# Patient Record
Sex: FEMALE | Race: WHITE | NOT HISPANIC OR LATINO | URBAN - METROPOLITAN AREA
[De-identification: names, ages, dates, MRNs, and addresses within clinical notes are randomized per-mention and may not be internally consistent; named-entity substitution may affect disease eponyms.]

---
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
## 2018-09-04 ENCOUNTER — EMERGENCY (EMERGENCY)
Facility: HOSPITAL | Age: 18
LOS: 1 days | Discharge: HOME | End: 2018-09-04
Admitting: EMERGENCY MEDICINE
Payer: COMMERCIAL

## 2018-09-04 VITALS
HEART RATE: 87 BPM | RESPIRATION RATE: 16 BRPM | OXYGEN SATURATION: 100 % | TEMPERATURE: 99 F | DIASTOLIC BLOOD PRESSURE: 78 MMHG | SYSTOLIC BLOOD PRESSURE: 114 MMHG

## 2018-09-04 DIAGNOSIS — F60.3 BORDERLINE PERSONALITY DISORDER: ICD-10-CM

## 2018-09-04 PROCEDURE — 90792 PSYCH DIAG EVAL W/MED SRVCS: CPT | Mod: GC

## 2018-09-04 PROCEDURE — 99285 EMERGENCY DEPT VISIT HI MDM: CPT

## 2018-09-04 NOTE — ED PROVIDER NOTE - OBJECTIVE STATEMENT
This is a 17 year old Female PMHX ANDIE from psychiatrist office for psych eval r/t suicidal ideations patient report a receter change in medication deyain nestor her perstique level is  and next week she will be adding Prozac. Today she told her therappuist in which she had suicidal ideations in which she would drive her care into a tree.t Denies any recnt self harm behavior. This is a 17 year old Female PMHX    ANDIE from psychiatrist office for psych eval r/t suicidal ideations patient reports a recent change in medication in which her Pristiq level is  and next week she will be adding Prozac. Today she told her therapist  she had suicidal ideations in which she would drive her care into a tree.  Denies any recent self harm behavior. Patient denies SI at this time Denies SI/HI Denies AH/VH Denies ETOH/Illicit drugs

## 2018-09-04 NOTE — ED BEHAVIORAL HEALTH ASSESSMENT NOTE - DETAILS
Patient had moment of SI with plan but no intent in therapists office while stressed. provider thought hypomania but seems dubious at present completed suicide in maternal aunt ETOH use disorder in family Discussed with Mother

## 2018-09-04 NOTE — ED BEHAVIORAL HEALTH ASSESSMENT NOTE - RISK ASSESSMENT
Patient poses moderate chronic and low acute risk for suicide, risk factors include: BPD, affective symptoms, anxiety protective factors and absent risk factors include: no hx of attempts, no hx of hospitalizations, SI is chrnoic and fleeting in nature with no intent, patient not currently self injurous, denies current SI, no substance, forward thinking: take care of cat, go to work, get back to school, start DBT, cites mother family, and the above as protective factors. Patient poses moderate chronic and low acute risk for suicide, risk factors include: BPD, affective symptoms, anxiety protective factors and absent risk factors include: no hx of attempts, no hx of hospitalizations, SI is chronic and fleeting in nature with no intent, patient not currently self injurious, denies current SI, no substance, forward thinking: take care of cat, go to work, get back to school, start DBT, cites mother family, and the above as protective factors.

## 2018-09-04 NOTE — ED BEHAVIORAL HEALTH ASSESSMENT NOTE - SUMMARY
17yo F, single college student, hx of affective symptoms tx in outpatient since age 7, presents from therapists office after she endorsed SI, currently meeting criteria for borderline personality disorder with r/o MDD, r/o ESTEFANIA, psychoeducation provided regarding expectations and DBT, patient's chronic and acute risk for suicide will be best reduced by outpatient treatment in DBT program which family is in process of starting. Appropriate for treat and release with close outpatient follow up from psychitrist and referral to DBT.  -Referrals provided for DBT by DARON and given to mother 18yo F, single college student, hx of affective symptoms tx in outpatient since age 7, presents from therapists office after she endorsed SI, currently meeting criteria for borderline personality disorder with r/o MDD, r/o ESTEFANIA, psychoeducation provided regarding expectations and DBT, patient's chronic and acute risk for suicide will be best reduced by outpatient treatment in DBT program which family is in process of starting. Appropriate for treat and release with close outpatient follow up from psychiatrist and referral to DBT.  -Referrals provided for DBT by DARON and given to mother

## 2018-09-04 NOTE — ED ADULT TRIAGE NOTE - CHIEF COMPLAINT QUOTE
Pt states if she had a car she'd swerve and hit something to kill herself.  Denies HI/drug/alcohol use.  Taking meds as prescribed Pt states if she had a car she'd swerve and hit something to kill herself.  Denies HI/drug/alcohol use.  Taking meds as prescribed.  PMH depression/anxiety

## 2018-09-04 NOTE — ED BEHAVIORAL HEALTH ASSESSMENT NOTE - SUICIDE PROTECTIVE FACTORS
Supportive social network or family/Responsibility to family and others/Positive therapeutic relationships/Identifies reasons for living/Engaged in work or school/Future oriented

## 2018-09-04 NOTE — ED BEHAVIORAL HEALTH ASSESSMENT NOTE - CASE SUMMARY
18yo F, single college student (on medical leave), hx of affective symptoms, in outpatient treatment since age 7, presents from therapist's office after she endorsed SI, currently meeting criteria for borderline personality disorder with r/o MDD, r/o ESTEFANIA. Patient endorsed suicidal ideation in context of panic attack in therapist office. Patient has chronic SI (however no history of prior suicide attempts), denying current intent in context of her borderline personality disorder.  Patient currently future oriented, closely engaged with outpatient providers, and in the process of engaging in DBT therapy. Both she and her mother feel safe to be discharged home. Safety planning discussed and patient to call 911 or return to nearest ER if symptoms should worsen.

## 2018-09-04 NOTE — ED BEHAVIORAL HEALTH ASSESSMENT NOTE - DESCRIPTION
no meds   ICU Vital Signs Last 24 Hrs  T(C): 37.1 (04 Sep 2018 19:13), Max: 37.1 (04 Sep 2018 19:13)  T(F): 98.7 (04 Sep 2018 19:13), Max: 98.7 (04 Sep 2018 19:13)  HR: 87 (04 Sep 2018 19:13) (87 - 87)  BP: 114/78 (04 Sep 2018 19:13) (114/78 - 114/78)  BP(mean): --  ABP: --  ABP(mean): --  RR: 16 (04 Sep 2018 19:13) (16 - 16)  SpO2: 100% (04 Sep 2018 19:13) (100% - 100%) asthma, no meds employed, student on leave

## 2018-09-04 NOTE — ED ADULT NURSE NOTE - CHIEF COMPLAINT QUOTE
Pt states if she had a car she'd swerve and hit something to kill herself.  Denies HI/drug/alcohol use.  Taking meds as prescribed.  PMH depression/anxiety

## 2018-09-04 NOTE — ED ADULT NURSE NOTE - NSIMPLEMENTINTERV_GEN_ALL_ED
Implemented All Universal Safety Interventions:  [place] to call system. Call bell, personal items and telephone within reach. Instruct patient to call for assistance. Room bathroom lighting operational. Non-slip footwear when patient is off stretcher. Physically safe environment: no spills, clutter or unnecessary equipment. Stretcher in lowest position, wheels locked, appropriate side rails in place.

## 2018-09-04 NOTE — ED PROVIDER NOTE - MEDICAL DECISION MAKING DETAILS
This is a 17 year old Female PMHX    ANDIE from psychiatrist office for psych eval r/t suicidal ideations patient reports a recent change in medication in which her Pristiq level is  and next week she will be adding Prozac. Today she told her therapist  she had suicidal ideations in which she would drive her care into a tree. Medical evaluation performed. There is no clinical evidence of intoxication or any acute medical problem requiring immediate intervention. Final disposition will be determined by psychiatrist.

## 2018-09-04 NOTE — ED ADULT NURSE NOTE - OBJECTIVE STATEMENT
pt is a 17 yr old female biba from md's office c/o suicidal thoughts, "I would drive may car into a tree". pt states she felt this  suicidal earlier today, but "not right now ". pt denies HI/ drug use/ alcohol use/ smoking.

## 2018-09-04 NOTE — ED BEHAVIORAL HEALTH NOTE - BEHAVIORAL HEALTH NOTE
Writer spoke with geo’s mother, Rupinder Jauregui, [phone], in the [facility] ED, for collateral information. She reported the following:    The patient resides with the informant. She has never had IP psychiatric care. She is in OP treatment with Dr. Shlomo Campbell, [phone], and Syl Keating, [phone]. Today she saw her therapist, who called the informant, reporting the patient had expressed suicidal ideation so the therapist had called 911.    The patient had been attending [facility] until she  began OP psychiatric treatment for depression a year ago. She is now on a medical leave from college. She has been eating and sleeping well. Her mood has been depressed continuously, and she felt her Pristiq was not working. Therefore, on 8/29/18, her Prisitq dosage, which was 50 mg daily, was cut in half in anticipation of starting her on fluoxetine 10 mg. The fluoxetine was originally to be started on Saturday, 9/8/18. However, since her mood has been more depressed for the past 3 days in the context of lowering her Pristiq, she was instructed to begin the fluoxetine tomorrow. She is also on clonazepam 0.5 mg prn not to exceed 1.5 mg daily, and a pre-metabolite of folic acid. 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 She has been socializing with others, and there is no evidence of anhedonia; she has joined a [email or web address] team. She has been tearful for the past few days. She has been suffering with minimal anergia and has been tearful, stating she feels “empty” for the past few days. The patient has never expressed passive or active suicidal ideation to the informant, including and has never engaged in self-injurious behavior. There has been no evidence of psychosis, and the patient has no history of substance abuse. There is no family history of mental illness.     The patient is allergic to sulfa drugs, which cause anaphylaxis, and Trintellix, which causes hives. She has also been tried on Zoloft, Latuda (which caused anxiety and restlessness).     The informant believes the patient is safe to be discharged and return to her OP providers. Writer spoke with geo’s mother, Rupinder Jauregui, [phone], in the [facility] ED, for collateral information. She reported the following:    The patient resides with the informant. She has never had IP psychiatric care. She is in OP treatment with Dr. Shlomo Campbell, [phone], and Syl Keating, [phone]. Today she saw her therapist, who called the informant, reporting the patient had expressed suicidal ideation so the therapist had called 911.    The patient had been [facility] [employer] [facility] until she  began OP psychiatric treatment for depression a year ago. She is now on a medical leave from college. She has been eating and sleeping well. Her mood has been depressed continuously, and she felt her Pristiq was not working. Therefore, on 8/29/18, her Prisitq dosage, which was 50 mg daily, was cut in half in anticipation of starting her on fluoxetine 10 mg. The fluoxetine was originally to be started on Saturday, 9/8/18. However, since her mood has been more depressed for the past 3 days in the context of lowering her Pristiq, she was instructed to begin the fluoxetine tomorrow. She is also on clonazepam 0.5 mg prn not to exceed 1.5 mg daily, and a pre-metabolite of folic acid. 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 She has been socializing with others, and there is no evidence of anhedonia; she has joined a [employer] team. She has been tearful for the past few days. She has been suffering with minimal anergia and has been tearful, stating she feels “empty” for the past few days. The patient has never expressed passive or active suicidal ideation to the informant, including and has never engaged in self-injurious behavior. There has been no evidence of psychosis, and the patient has no history of substance abuse. There is no family history of mental illness.     The patient is allergic to sulfa drugs, which cause anaphylaxis, and Trintellix, which causes hives. She has also been tried on Zoloft, Latuda (which caused anxiety and restlessness).     The informant believes the patient is safe to be discharged and return to her OP providers. The next scheduled appointments are with the psychiatrist on 9/19/18, which the informant will try and change to an earlier date. The next appointment with the therapist is early next month, date unknown. Writer provided numerous DBT referrals, determined to be needed by Dr. Campbell and the [facility] ED evaluating psychiatrist. Writer also provided psychoeducation regarding [email or web address] and [email or web address]. Writer spoke with geo’s mother, Rupinder Jauregui, [phone], in the [facility] ED, for collateral information. She reported the following:    The patient resides with the informant. She has never had IP psychiatric care. She is in OP treatment with Dr. Shlomo Campbell, [phone], and Syl Keating, [phone]. Today she saw her therapist, who called the informant, reporting the patient had expressed suicidal ideation so the therapist had called 911.    The patient had been attending [employer] [facility] [employer] until she  began OP psychiatric treatment for depression a year ago. She is now on a medical leave from college. She has been eating and sleeping well. Her mood has been depressed continuously, and she felt her Pristiq was not working. Therefore, on 8/29/18, her Prisitq dosage, which was 50 mg daily, was cut in half in anticipation of starting her on fluoxetine 10 mg. The fluoxetine was originally to be started on Saturday, 9/8/18. However, since her mood has been more depressed for the past 3 days in the context of lowering her Pristiq, she was instructed to begin the fluoxetine tomorrow. She is also on clonazepam 0.5 mg prn not to exceed 1.5 mg daily, and a pre-metabolite of folic acid. 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 She has been socializing with others, and there is no evidence of anhedonia; she has joined a [employer] team. She has been tearful for the past few days. She has been suffering with minimal anergia and has been tearful, stating she feels “empty” for the past few days. The patient has never expressed passive or active suicidal ideation to the informant, including and has never engaged in self-injurious behavior. There has been no evidence of psychosis, and the patient has no history of substance abuse. There is no family history of mental illness.     The patient is allergic to sulfa drugs, which cause anaphylaxis, and Trintellix, which causes hives. She has also been tried on Zoloft, Latuda (which caused anxiety and restlessness).     The informant believes the patient is safe to be discharged and return to her OP providers. The next scheduled appointments are with the psychiatrist on 9/19/18, which the informant will try and change to an earlier date. The next appointment with the therapist is early next month, date unknown. Writer provided numerous DBT referrals, determined to be needed by Dr. Campbell and the [facility] ED evaluating psychiatrist. Writer also provided psychoeducation regarding [email or web address] and [email or web address], since the informant mentioned difficulty paying for private providers.
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
## 2020-07-22 ENCOUNTER — RESULT REVIEW (OUTPATIENT)
Age: 20
End: 2020-07-22

## 2020-07-22 ENCOUNTER — EMERGENCY (EMERGENCY)
Facility: HOSPITAL | Age: 20
LOS: 1 days | Discharge: HOME | End: 2020-07-22
Admitting: EMERGENCY MEDICINE
Payer: COMMERCIAL

## 2020-07-22 VITALS
DIASTOLIC BLOOD PRESSURE: 70 MMHG | HEART RATE: 92 BPM | RESPIRATION RATE: 16 BRPM | SYSTOLIC BLOOD PRESSURE: 118 MMHG | TEMPERATURE: 98 F | OXYGEN SATURATION: 100 %

## 2020-07-22 DIAGNOSIS — F32.9 MAJOR DEPRESSIVE DISORDER, SINGLE EPISODE, UNSPECIFIED: ICD-10-CM

## 2020-07-22 DIAGNOSIS — F60.3 BORDERLINE PERSONALITY DISORDER: ICD-10-CM

## 2020-07-22 PROCEDURE — 99284 EMERGENCY DEPT VISIT MOD MDM: CPT

## 2020-07-22 PROCEDURE — 90792 PSYCH DIAG EVAL W/MED SRVCS: CPT

## 2020-07-22 NOTE — ED BEHAVIORAL HEALTH ASSESSMENT NOTE - REFERRAL / APPOINTMENT DETAILS
follow with Dr. Shlomo Odell Follow-up with Dr. Gutierrez on 7/27/20 at 2:00 PM; follow-up with therapist Amee Gallego tomorrow (7/23/20)
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
## 2020-07-22 NOTE — ED PROVIDER NOTE - OBJECTIVE STATEMENT
This is a 19 yr old F, pmh bipolar disorder, anxiety, depression , hypothyroid with c/o sever depression and SI for a month. Pt went today to her obgyn and expressed SI, she was sent in for further eval. Pt admits had previous suicidal attempt, cutting herself, years ago. PT currently seeing Dr Yanez () and he is veaning her off lamictal. This is a 19 yr old F, pmh bipolar disorder, anxiety, depression , hypothyroid with c/o sever depression and SI for a month. Pt went today to her obgyn and expressed SI, she was sent in for further eval. Pt admits had previous suicidal attempt, cutting herself, years ago. PT currently seeing Dr DAVIS Gutierrez () and he is weaning her off lamictal.

## 2020-07-22 NOTE — ED PROVIDER NOTE - NSFOLLOWUPINSTRUCTIONS_ED_ALL_ED_FT
Please follow up with your out patient psychiatrist as soon as possible.    Anxiety    Generalized anxiety disorder (ESTEFANIA) is a mental disorder. It is defined as anxiety that is not necessarily related to specific events or activities or is out of proportion to said events. Symptoms include restlessness, fatigue, difficulty concentrations, irritability and difficulty concentrating. It may interfere with life functions, including relationships, work, and school. If you were started on a medication, make sure to take exactly as prescribed and follow up with a psychiatrist.    SEEK IMMEDIATE MEDICAL CARE IF YOU HAVE ANY OF THE FOLLOWING SYMPTOMS: thoughts about hurting killing yourself, thoughts about hurting or killing somebody else, hallucinations, or worsening depression.  Depression    Depression is a mental illness that usually causes feelings of sadness, hopelessness, or helplessness. Some people with this disorder do not feel particularly sad but lose interest in doing things they used to enjoy. Major depressive disorder also can cause physical symptoms. It can interfere with work, school, relationships, and other normal everyday activities. If you were started on a medication, make sure to take exactly as prescribed and follow up with a psychiatrist.    SEEK IMMEDIATE MEDICAL CARE IF YOU HAVE ANY OF THE FOLLOWING SYMPTOMS: thoughts about hurting or killing yourself, thoughts about hurting or killing somebody else, hallucinations, or worsening depression.

## 2020-07-22 NOTE — ED BEHAVIORAL HEALTH ASSESSMENT NOTE - DETAILS
Patient had moment of SI with plan but no intent in therapists office while stressed. provider thought hypomania but seems dubious at present completed suicide in maternal aunt ETOH use disorder in family Discussed with Mother see HPI provider thought hypomania but seems dubious pt alludes to extensive h/o trauma, declines to provide details informed mother

## 2020-07-22 NOTE — ED BEHAVIORAL HEALTH ASSESSMENT NOTE - SUICIDE RISK FACTORS
Agitation/Severe Anxiety/Panic History of abuse/trauma/Cluster B Personality disorders or traits current/past/Family History of Suicidal behavior/Current mood episode

## 2020-07-22 NOTE — ED BEHAVIORAL HEALTH ASSESSMENT NOTE - SAFETY PLAN DETAILS
Call 911 or return to ED if you experience worsening SI or become danger to self or others. Call 911 or return to ED if worsening symptoms or if you develop thoughts of harming self or others

## 2020-07-22 NOTE — ED BEHAVIORAL HEALTH ASSESSMENT NOTE - OTHER
CVM "more depressed" pt refused to complete safety plan form but she engaged in verbal safety planning

## 2020-07-22 NOTE — ED BEHAVIORAL HEALTH ASSESSMENT NOTE - SUICIDE PROTECTIVE FACTORS
Responsibility to family and others/Identifies reasons for living/Has future plans/Supportive social network of family or friends/Engaged in work or school/Positive therapeutic relationships Responsibility to family and others/Supportive social network of family or friends/Positive therapeutic relationships/Has future plans/Identifies reasons for living
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
## 2020-07-22 NOTE — ED BEHAVIORAL HEALTH NOTE - BEHAVIORAL HEALTH NOTE
Writer contacted and spoke with pt’s mother, Rupinder, at [phone] who provided the following information:     Mother reports pt with hx of Depression and Anxiety. Pt resides with mother. Pt follows up regularly with outpatient treatment. Mother denies any concerns. Mother felt pt has been doing much better. No symptoms reported to mother. Mother denies concerns for SI/HI/AH/VH paranoia or psychosis. No access to firearms. No hx of substance use. Pt compliant with outpatient treatment. Mother reports pt in treatment with 2 therapists. Mother says 1 weekly and 1 monthly. Mother unable to provide therapist number at this time. Pt also in session with psychiatrist weekly as well. Pt compliant with medications. Mother reports Prozac increase a few days ago. No acute safety concerns reported. Mother wishes to pick pt up at this time. independent

## 2020-07-22 NOTE — ED BEHAVIORAL HEALTH ASSESSMENT NOTE - DESCRIPTION
asthma, no meds employed, student on leave calm, cooperative, in good behavioral control    Vital Signs Last 24 Hrs  T(C): 36.8 (22 Jul 2020 17:40), Max: 36.8 (22 Jul 2020 17:40)  T(F): 98.2 (22 Jul 2020 17:40), Max: 98.2 (22 Jul 2020 17:40)  HR: 92 (22 Jul 2020 17:40) (92 - 92)  BP: 118/70 (22 Jul 2020 17:40) (118/70 - 118/70)  BP(mean): --  RR: 16 (22 Jul 2020 17:40) (16 - 16)  SpO2: 100% (22 Jul 2020 17:40) (100% - 100%) hypothyroidism see HPI
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
## 2020-07-22 NOTE — ED BEHAVIORAL HEALTH ASSESSMENT NOTE - DIFFERENTIAL
borderline personality disorder with r/o MDD, r/o ESTEFANIA borderline personality disorder with r/o MDD

## 2020-07-22 NOTE — ED PROVIDER NOTE - PATIENT PORTAL LINK FT
You can access the FollowMyHealth Patient Portal offered by  by registering at the following website: [email or web address]. By joining [employer]’s FollowMyHealth portal, you will also be able to view your health information using other applications (apps) compatible with our system.

## 2020-07-22 NOTE — ED PROVIDER NOTE - CARE PLAN
Principal Discharge DX:	Borderline personality disorder  Secondary Diagnosis:	Depression, unspecified depression type

## 2020-07-22 NOTE — ED ADULT NURSE REASSESSMENT NOTE - NS ED NURSE REASSESS COMMENT FT1
Pt calm and cooperative at this time. Cleared for d/c by SYDNEE Pereyra. Family is scheduled to transport pt back to residence.

## 2020-07-22 NOTE — ED BEHAVIORAL HEALTH ASSESSMENT NOTE - SUMMARY
20 y/o female, single, noncaregiver, living with mother, not working and not in school, history of Borderline Personality Disorder Patient, no hx of SA, no hospitalizations, hx of SIB (between 7th to 9th grade), currently followed by outpatient psychiatrist Dr. Gutierrez and outpatient therapist Amee Gallego, no h/o violence or legal issues, PMH hypothyroidism, no h/o substance abuse, BIBEMS from OB/gyn's (Dr. Ng) office for worsening depression and SI.   Patient reports intermittent passive SI and fleeting fantasy of "swerving her car off the road" but adamantly denies wanting to act on this. She adamantly denies suicidal intent or plan. She currently denies passive or active SI. 18 y/o female, single, noncaregiver, living with mother, not working and not in school, history of Borderline Personality Disorder Patient, no hx of SA, no hospitalizations, hx of SIB (between 7th to 9th grade), currently followed by outpatient psychiatrist Dr. Gutierrez and outpatient therapist Amee Gallego, no h/o violence or legal issues, PMH hypothyroidism, no h/o substance abuse, BIBEMS from OB/gyn's (Dr. Ng) office for worsening depression and SI.   Patient reports intermittent passive SI and fleeting fantasy of "swerving her car off the road" but adamantly denies wanting to act on this. She adamantly denies suicidal intent or plan. She currently denies passive or active SI. Pt identifies reasons for living and is future-oriented. She is not manic or psychotic. Pt's mother and Dr. Gutierrez deny acute safety concerns. She does not present an acute danger to self or others and does not meet criteria for involuntary psychiatric admission at this time. Pt declines voluntary psychiatric admission at this time.

## 2020-07-22 NOTE — ED BEHAVIORAL HEALTH ASSESSMENT NOTE - ACTIVATING EVENTS/STRESSORS
Change in provider or treatment (i.e., medications, psychotherapy, milieu) Triggering events leading to humiliation, shame, and/or despair (e.g. Loss of relationship, financial or health status) (real or anticipated)
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
## 2020-07-22 NOTE — ED BEHAVIORAL HEALTH ASSESSMENT NOTE - RISK ASSESSMENT
Patient poses moderate chronic and low acute risk for suicide, risk factors include: BPD, affective symptoms, anxiety protective factors and absent risk factors include: no hx of attempts, no hx of hospitalizations, SI is chronic and fleeting in nature with no intent, patient not currently self injurious, denies current SI, no substance, forward thinking: take care of cat, go to work, get back to school, start DBT, cites mother family, and the above as protective factors. Protective factors include no suicide attempts, no violence history, medication compliance, no access to guns, no global insomnia, no substance abuse, supportive family, willingness to seek help, no suicidal ideation or homicidal ideation, identifies reasons for living.  Risk factors include history of self-harming (cutting) in past, borderline personality disorder, h/o suicide in maternal aunt.   Pt is not at acutely elevated risk of harm to self or others. Low Acute Suicide Risk

## 2020-07-22 NOTE — ED BEHAVIORAL HEALTH ASSESSMENT NOTE - CURRENT MEDICATION
Lamictal 300mg, Klonopin 0.5mg PRN, Pristiq 50mg and down titrating Lamictal 150 mg po QHS, Prozac 80 mg po daily, Klonopin 0.5 mg po TID prn anxiety, Leucovorin 25 mg po daily, Macrobid for UTI (unknown dose), Augmentin for strep throat (unknown dose)

## 2020-07-24 NOTE — ED BEHAVIORAL HEALTH NOTE - BEHAVIORAL HEALTH NOTE
HIGH RISK LOG:   called patient at her number ([phone]) to follow up and see how she was feeling.  Pt did not answer and voicemail was left with [facility] ED phone number.

## 2020-07-25 NOTE — ED BEHAVIORAL HEALTH NOTE - BEHAVIORAL HEALTH NOTE
High Risk Log: Writer called pt at  who states she is doing better, and has an appointment with her therapist and psychiatrist on Monday.

## 2021-06-30 PROBLEM — F32.9 MAJOR DEPRESSIVE DISORDER, SINGLE EPISODE, UNSPECIFIED: Chronic | Status: ACTIVE | Noted: 2020-07-22

## 2021-06-30 PROBLEM — F41.9 ANXIETY DISORDER, UNSPECIFIED: Chronic | Status: ACTIVE | Noted: 2020-07-22

## 2021-06-30 PROBLEM — F31.9 BIPOLAR DISORDER, UNSPECIFIED: Chronic | Status: ACTIVE | Noted: 2020-07-22

## 2021-07-08 ENCOUNTER — NON-APPOINTMENT (OUTPATIENT)
Age: 21
End: 2021-07-08

## 2021-08-30 ENCOUNTER — APPOINTMENT (OUTPATIENT)
Dept: INTERNAL MEDICINE | Facility: CLINIC | Age: 21
End: 2021-08-30
Payer: COMMERCIAL

## 2021-08-30 VITALS
TEMPERATURE: 98 F | BODY MASS INDEX: 31.92 KG/M2 | OXYGEN SATURATION: 99 % | HEIGHT: 57.48 IN | RESPIRATION RATE: 16 BRPM | SYSTOLIC BLOOD PRESSURE: 111 MMHG | WEIGHT: 150 LBS | DIASTOLIC BLOOD PRESSURE: 74 MMHG | HEART RATE: 84 BPM

## 2021-08-30 DIAGNOSIS — Z83.49 FAMILY HISTORY OF OTHER ENDOCRINE, NUTRITIONAL AND METABOLIC DISEASES: ICD-10-CM

## 2021-08-30 PROCEDURE — 99385 PREV VISIT NEW AGE 18-39: CPT | Mod: 25

## 2021-09-13 ENCOUNTER — TRANSCRIPTION ENCOUNTER (OUTPATIENT)
Age: 21
End: 2021-09-13

## 2021-10-26 ENCOUNTER — TRANSCRIPTION ENCOUNTER (OUTPATIENT)
Age: 21
End: 2021-10-26

## 2022-01-12 ENCOUNTER — EMERGENCY (EMERGENCY)
Facility: HOSPITAL | Age: 22
LOS: 1 days | Discharge: HOME | End: 2022-01-12
Attending: EMERGENCY MEDICINE | Admitting: EMERGENCY MEDICINE
Payer: COMMERCIAL

## 2022-01-12 VITALS
SYSTOLIC BLOOD PRESSURE: 114 MMHG | RESPIRATION RATE: 18 BRPM | HEART RATE: 90 BPM | OXYGEN SATURATION: 100 % | DIASTOLIC BLOOD PRESSURE: 73 MMHG | TEMPERATURE: 98 F

## 2022-01-12 PROCEDURE — 99284 EMERGENCY DEPT VISIT MOD MDM: CPT | Mod: 25

## 2022-01-12 PROCEDURE — 93010 ELECTROCARDIOGRAM REPORT: CPT

## 2022-01-12 RX ORDER — CLONAZEPAM 1 MG
0.5 TABLET ORAL ONCE
Refills: 0 | Status: DISCONTINUED | OUTPATIENT
Start: 2022-01-12 | End: 2022-01-12

## 2022-01-12 RX ORDER — LAMOTRIGINE 25 MG/1
125 TABLET, ORALLY DISINTEGRATING ORAL ONCE
Refills: 0 | Status: COMPLETED | OUTPATIENT
Start: 2022-01-12 | End: 2022-01-12

## 2022-01-12 RX ADMIN — Medication 40 MILLIGRAM(S): at 23:30

## 2022-01-12 RX ADMIN — LAMOTRIGINE 125 MILLIGRAM(S): 25 TABLET, ORALLY DISINTEGRATING ORAL at 23:28

## 2022-01-12 RX ADMIN — Medication 0.5 MILLIGRAM(S): at 23:29

## 2022-01-12 NOTE — ED ADULT NURSE NOTE - OBJECTIVE STATEMENT
Pt received to rm 27, A&OX4, ambulatory. C/o feeling anxious, having palpitations, and generalized weakness. Pt states she was resting at home when she suddenly had an increased HR, measured 140-170 via pulse ox. Trembling of hands noted. Pt states she had an adjustment to her psych meds about 1 month ago. Denies CP, SOB, N/V, dizziness, blurry vision, HA, LOC. Received with 20G IV to R AC placed by EMS with 1L NS running. Respirations even and unlabored. Will continue to monitor.

## 2022-01-12 NOTE — ED ADULT NURSE NOTE - CHIEF COMPLAINT QUOTE
Presents to ED via EMS, c/o palpitations around 07:30 PM and endorsing dizziness and generalized weakness. Per family member, HR was in 140s from pulse ox. No complaints of chest pain, SOB, nausea or vomiting. Denies ETOH or drug use, SI/HI. Arrived with 20G IV on right AC, received a liter of NS from EMS. PMH asthma, bipolar disorder, depression and anxiety.    Per MD Iyer, no code stroke

## 2022-01-12 NOTE — ED PROVIDER NOTE - CLINICAL SUMMARY MEDICAL DECISION MAKING FREE TEXT BOX
22 y/o female with pmhx of asthma, bipolar disorder, depression, and anxiety presenting with episode of dizziness, nausea, palpitations, and generalized weakness with no LOC, chest pain, SOB. EKG with no acute changes, HR now in 80s, hemodynamically stable, persistently anxious, and symptoms resolved other than mild dizziness. Will check Upreg and give missed anxiety/depression nighttime meds and reassess

## 2022-01-12 NOTE — ED PROVIDER NOTE - OBJECTIVE STATEMENT
20 y/o female with pmhx of asthma, bipolar disorder, depression, and anxiety presenting with episode of dizziness, nausea, palpitations, and generalized weakness. Per mother, HR was in 140s. Patient denies LOC, chest pain, SOB, dyspnea, numbness/tingling, or vomiting. No drug/ETOG use. No vaginal bleeding. No recent fevers/chills, n/v/d, cough, rashes, or changes in urination. Did not take nightly depression/anxiety meds (Klonopin, paxil, lamictal). Symptoms resolved in ER other than mild dizziness.

## 2022-01-12 NOTE — ED PROVIDER NOTE - PROGRESS NOTE DETAILS
Alvin, PGY-2  Patient reassessed and appears much less anxious and states that she feels back at her baseline. Upreg negative. EKG with no acute abnormalities. Will d/c with return precautions

## 2022-01-12 NOTE — ED PROVIDER NOTE - NSFOLLOWUPINSTRUCTIONS_ED_ALL_ED_FT
A palpitation is the feeling that your heartbeat is irregular or is faster than normal. It may feel like your heart is fluttering or skipping a beat. They may be caused by many things, including smoking, caffeine, alcohol, stress, and certain medicines. Although most causes of palpitations are not serious, palpitations can be a sign of a serious medical problem. Avoid caffeine, alcohol, and tobacco products at home. Try to reduce stress and anxiety and make sure to get plenty of rest.     SEEK IMMEDIATE MEDICAL CARE IF YOU HAVE ANY OF THE FOLLOWING SYMPTOMS: chest pain, shortness of breath, severe headache, dizziness/lightheadedness, or fainting.

## 2022-01-12 NOTE — ED PROVIDER NOTE - PATIENT PORTAL LINK FT
You can access the FollowMyHealth Patient Portal offered by [facility] by registering at the following website: [email or web address]. By joining [employer]’s FollowMyHealth portal, you will also be able to view your health information using other applications (apps) compatible with our system.

## 2022-01-12 NOTE — ED PROVIDER NOTE - NS ED ROS FT
Gen: Denies fever, weight loss  CV: Denies chest pain  Skin: Denies rash, erythema, color changes  Resp: Denies SOB, cough  Endo: Denies sensitivity to heat, cold, increased urination  GI: Denies diarrhea, constipation, vomiting  Msk: Denies back pain, LE swelling, extremity pain  : Denies dysuria, increased frequency  Neuro: Denies LOC, weakness

## 2022-01-12 NOTE — ED PROVIDER NOTE - PHYSICAL EXAMINATION
Gen: WDWN, anxious, HR in 80s   HEENT: PERRLA, EOMI, no nasal discharge, mucous membranes moist, no oropharyngeal edema/erythema/exudates   CV: RRR, +S1/S2, no M/R/G, equal b/l radial pulses 2+  Resp: CTAB, no W/R/R, SPO2 >95% on RA, no increased WOB   GI: Abdomen soft non-distended, NTTP, no masses/organomegaly   Skin: No open wounds, no bruising, no LE edema  Neuro: A&Ox4, moving all 4 extremities spontaneously, gross sensation intact in UE and LE BL  Psych: anxious

## 2022-01-12 NOTE — ED ADULT TRIAGE NOTE - CHIEF COMPLAINT QUOTE
Presents to ED via EMS, c/o palpitations around 07:30 PM and endorsing dizziness. Per family member, HR was in 140s from pulse ox. No complaints of chest pain, SOB, nausea or vomiting. Denies ETOH or drug use, SI/HI. PMH asthma, bipolar disorder, depression and anxiety. Presents to ED via EMS, c/o palpitations around 07:30 PM and endorsing dizziness and generalized weakness. Per family member, HR was in 140s from pulse ox. No complaints of chest pain, SOB, nausea or vomiting. Denies ETOH or drug use, SI/HI. Arrived with 20G IV on right AC from EMS. PMH asthma, bipolar disorder, depression and anxiety. Presents to ED via EMS, c/o palpitations around 07:30 PM and endorsing dizziness and generalized weakness. Per family member, HR was in 140s from pulse ox. No complaints of chest pain, SOB, nausea or vomiting. Denies ETOH or drug use, SI/HI. Arrived with 20G IV on right AC, received a liter of NS from EMS. PMH asthma, bipolar disorder, depression and anxiety. Presents to ED via EMS, c/o palpitations around 07:30 PM and endorsing dizziness and generalized weakness. Per family member, HR was in 140s from pulse ox. No complaints of chest pain, SOB, nausea or vomiting. Denies ETOH or drug use, SI/HI. Arrived with 20G IV on right AC, received a liter of NS from EMS. PMH asthma, bipolar disorder, depression and anxiety.    Per MD Iyer, no code stroke

## 2022-01-12 NOTE — ED PROVIDER NOTE - ATTENDING CONTRIBUTION TO CARE
The patient is a 21y Female who has a past medical and surgery history of Asthma Bipolar disorder Depression Anxiety PTED with Presents to ED via EMS, c/o palpitations around 07:30 PM and endorsing dizziness and generalized weakness. Per family member, HR was in 140s from pulse ox. No complaints of chest pain, SOB, nausea or vomiting. Denies ETOH or drug use, SI/HI. Arrived with 20G IV on right AC, received a liter of NS from EMS. PMH asthma, bipolar disorder, depression and anxiety   Vital Signs Last 24 Hrs  T(F): 98.1 HR: 90 BP: 114/73 RR: 18 SpO2: 100% (12 Jan 2022 20:52) (100% - 100%)  PE: as described; my additions and exceptions are noted in the chart    DATA:  EKG: Normal; NSR@79   LAB: Pending at time of evaluation    IMPRESSION/RISK:  Dx=Palpitations in young female with normal EKG and exam     Consideration include doubt organic dz; anxiety and possible med withdrawal  Plan  ucg   reg meds  NS bolus  reassess  reassurance  d/c with followup

## 2022-01-13 VITALS
OXYGEN SATURATION: 100 % | RESPIRATION RATE: 18 BRPM | HEART RATE: 87 BPM | SYSTOLIC BLOOD PRESSURE: 111 MMHG | TEMPERATURE: 98 F | DIASTOLIC BLOOD PRESSURE: 73 MMHG

## 2022-02-04 ENCOUNTER — TRANSCRIPTION ENCOUNTER (OUTPATIENT)
Age: 22
End: 2022-02-04

## 2022-03-16 ENCOUNTER — APPOINTMENT (OUTPATIENT)
Dept: INTERNAL MEDICINE | Facility: CLINIC | Age: 22
End: 2022-03-16

## 2022-05-02 ENCOUNTER — APPOINTMENT (OUTPATIENT)
Dept: INTERNAL MEDICINE | Facility: CLINIC | Age: 22
End: 2022-05-02

## 2022-05-02 ENCOUNTER — APPOINTMENT (OUTPATIENT)
Dept: INTERNAL MEDICINE | Facility: CLINIC | Age: 22
End: 2022-05-02
Payer: COMMERCIAL

## 2022-05-02 VITALS
WEIGHT: 160 LBS | HEIGHT: 57.48 IN | RESPIRATION RATE: 16 BRPM | DIASTOLIC BLOOD PRESSURE: 76 MMHG | OXYGEN SATURATION: 98 % | SYSTOLIC BLOOD PRESSURE: 120 MMHG | BODY MASS INDEX: 34.04 KG/M2 | TEMPERATURE: 97.9 F | HEART RATE: 91 BPM

## 2022-05-02 DIAGNOSIS — R35.0 FREQUENCY OF MICTURITION: ICD-10-CM

## 2022-05-02 PROCEDURE — 81003 URINALYSIS AUTO W/O SCOPE: CPT | Mod: QW

## 2022-05-02 PROCEDURE — 99214 OFFICE O/P EST MOD 30 MIN: CPT

## 2022-05-02 RX ORDER — NITROFURANTOIN MACROCRYSTALS 100 MG/1
100 CAPSULE ORAL
Qty: 10 | Refills: 0 | Status: ACTIVE | COMMUNITY
Start: 2022-05-02 | End: 1900-01-01

## 2022-05-04 LAB
ALBUMIN SERPL ELPH-MCNC: 4.2 G/DL
ALP BLD-CCNC: 79 U/L
ALT SERPL-CCNC: 10 U/L
ANION GAP SERPL CALC-SCNC: 13 MMOL/L
AST SERPL-CCNC: 15 U/L
BACTERIA UR CULT: NORMAL
BASOPHILS # BLD AUTO: 0.03 K/UL
BASOPHILS NFR BLD AUTO: 0.6 %
BILIRUB SERPL-MCNC: 0.2 MG/DL
BILIRUB UR QL STRIP: NEGATIVE
BUN SERPL-MCNC: 7 MG/DL
C TRACH RRNA SPEC QL NAA+PROBE: NOT DETECTED
CALCIUM SERPL-MCNC: 9.1 MG/DL
CHLORIDE SERPL-SCNC: 103 MMOL/L
CHOLEST SERPL-MCNC: 212 MG/DL
CLARITY UR: CLEAR
CO2 SERPL-SCNC: 23 MMOL/L
COLLECTION METHOD: NORMAL
CREAT SERPL-MCNC: 0.71 MG/DL
EGFR: 122 ML/MIN/1.73M2
EOSINOPHIL # BLD AUTO: 0.02 K/UL
EOSINOPHIL NFR BLD AUTO: 0.4 %
GLUCOSE SERPL-MCNC: 90 MG/DL
GLUCOSE UR-MCNC: NEGATIVE
HAV IGM SER QL: NONREACTIVE
HBV CORE IGM SER QL: NONREACTIVE
HBV SURFACE AG SER QL: NONREACTIVE
HCG UR QL: 0.2 EU/DL
HCT VFR BLD CALC: 38.6 %
HCV AB SER QL: NONREACTIVE
HCV S/CO RATIO: 0.12 S/CO
HDLC SERPL-MCNC: 77 MG/DL
HGB BLD-MCNC: 12.2 G/DL
HGB UR QL STRIP.AUTO: NORMAL
HIV1+2 AB SPEC QL IA.RAPID: NONREACTIVE
IMM GRANULOCYTES NFR BLD AUTO: 0.4 %
KETONES UR-MCNC: NEGATIVE
LDLC SERPL CALC-MCNC: 115 MG/DL
LEUKOCYTE ESTERASE UR QL STRIP: NORMAL
LYMPHOCYTES # BLD AUTO: 2.19 K/UL
LYMPHOCYTES NFR BLD AUTO: 41.7 %
M TB IFN-G BLD-IMP: NEGATIVE
MAN DIFF?: NORMAL
MCHC RBC-ENTMCNC: 29.5 PG
MCHC RBC-ENTMCNC: 31.6 GM/DL
MCV RBC AUTO: 93.5 FL
MONOCYTES # BLD AUTO: 0.38 K/UL
MONOCYTES NFR BLD AUTO: 7.2 %
N GONORRHOEA RRNA SPEC QL NAA+PROBE: NOT DETECTED
NEUTROPHILS # BLD AUTO: 2.61 K/UL
NEUTROPHILS NFR BLD AUTO: 49.7 %
NITRITE UR QL STRIP: NEGATIVE
NONHDLC SERPL-MCNC: 135 MG/DL
PH UR STRIP: 5.5
PLATELET # BLD AUTO: 340 K/UL
POTASSIUM SERPL-SCNC: 4.3 MMOL/L
PROT SERPL-MCNC: 7.3 G/DL
PROT UR STRIP-MCNC: NORMAL
QUANTIFERON TB PLUS MITOGEN MINUS NIL: 9.84 IU/ML
QUANTIFERON TB PLUS NIL: 0.01 IU/ML
QUANTIFERON TB PLUS TB1 MINUS NIL: 0.06 IU/ML
QUANTIFERON TB PLUS TB2 MINUS NIL: 0.09 IU/ML
RBC # BLD: 4.13 M/UL
RBC # FLD: 12.5 %
SODIUM SERPL-SCNC: 140 MMOL/L
SOURCE AMPLIFICATION: NORMAL
SP GR UR STRIP: 1.03
T PALLIDUM AB SER QL IA: NEGATIVE
TRIGL SERPL-MCNC: 97 MG/DL
TSH SERPL-ACNC: 4.99 UIU/ML
WBC # FLD AUTO: 5.25 K/UL

## 2022-05-05 ENCOUNTER — NON-APPOINTMENT (OUTPATIENT)
Age: 22
End: 2022-05-05

## 2022-05-23 ENCOUNTER — NON-APPOINTMENT (OUTPATIENT)
Age: 22
End: 2022-05-23

## 2022-07-28 NOTE — ED ADULT TRIAGE NOTE - CADM TRG TX PRIOR TO ARRIVAL
none + prox phaylx fracture and laceration. +tendon injury. Ortho consulted. abx given and lac repaired. will follow with ortho.

## 2022-10-11 ENCOUNTER — INPATIENT (INPATIENT)
Facility: HOSPITAL | Age: 22
LOS: 2 days | Discharge: HOME | End: 2022-10-14
Attending: PSYCHIATRY & NEUROLOGY | Admitting: PSYCHIATRY & NEUROLOGY

## 2022-10-11 VITALS
RESPIRATION RATE: 16 BRPM | OXYGEN SATURATION: 100 % | TEMPERATURE: 99 F | SYSTOLIC BLOOD PRESSURE: 130 MMHG | DIASTOLIC BLOOD PRESSURE: 69 MMHG | HEART RATE: 123 BPM

## 2022-10-11 DIAGNOSIS — F60.3 BORDERLINE PERSONALITY DISORDER: ICD-10-CM

## 2022-10-11 LAB
ALBUMIN SERPL ELPH-MCNC: 4.3 G/DL — SIGNIFICANT CHANGE UP (ref 3.3–5)
ALP SERPL-CCNC: 80 U/L — SIGNIFICANT CHANGE UP (ref 40–120)
ALT FLD-CCNC: 14 U/L — SIGNIFICANT CHANGE UP (ref 4–33)
ANION GAP SERPL CALC-SCNC: 14 MMOL/L — SIGNIFICANT CHANGE UP (ref 7–14)
APAP SERPL-MCNC: <10 UG/ML — LOW (ref 15–25)
AST SERPL-CCNC: 16 U/L — SIGNIFICANT CHANGE UP (ref 4–32)
BASOPHILS # BLD AUTO: 0.04 K/UL — SIGNIFICANT CHANGE UP (ref 0–0.2)
BASOPHILS NFR BLD AUTO: 0.6 % — SIGNIFICANT CHANGE UP (ref 0–2)
BILIRUB SERPL-MCNC: 0.3 MG/DL — SIGNIFICANT CHANGE UP (ref 0.2–1.2)
BUN SERPL-MCNC: 5 MG/DL — LOW (ref 7–23)
CALCIUM SERPL-MCNC: 9.1 MG/DL — SIGNIFICANT CHANGE UP (ref 8.4–10.5)
CHLORIDE SERPL-SCNC: 104 MMOL/L — SIGNIFICANT CHANGE UP (ref 98–107)
CO2 SERPL-SCNC: 24 MMOL/L — SIGNIFICANT CHANGE UP (ref 22–31)
CREAT SERPL-MCNC: 0.87 MG/DL — SIGNIFICANT CHANGE UP (ref 0.5–1.3)
EGFR: 96 ML/MIN/1.73M2 — SIGNIFICANT CHANGE UP
EOSINOPHIL # BLD AUTO: 0.01 K/UL — SIGNIFICANT CHANGE UP (ref 0–0.5)
EOSINOPHIL NFR BLD AUTO: 0.2 % — SIGNIFICANT CHANGE UP (ref 0–6)
ETHANOL SERPL-MCNC: <10 MG/DL — SIGNIFICANT CHANGE UP
FLUAV AG NPH QL: SIGNIFICANT CHANGE UP
FLUBV AG NPH QL: SIGNIFICANT CHANGE UP
GLUCOSE SERPL-MCNC: 107 MG/DL — HIGH (ref 70–99)
HCG SERPL-ACNC: <5 MIU/ML — SIGNIFICANT CHANGE UP
HCT VFR BLD CALC: 37.7 % — SIGNIFICANT CHANGE UP (ref 34.5–45)
HGB BLD-MCNC: 11.9 G/DL — SIGNIFICANT CHANGE UP (ref 11.5–15.5)
IANC: 4.16 K/UL — SIGNIFICANT CHANGE UP (ref 1.8–7.4)
IMM GRANULOCYTES NFR BLD AUTO: 0.2 % — SIGNIFICANT CHANGE UP (ref 0–0.9)
LYMPHOCYTES # BLD AUTO: 1.89 K/UL — SIGNIFICANT CHANGE UP (ref 1–3.3)
LYMPHOCYTES # BLD AUTO: 28.8 % — SIGNIFICANT CHANGE UP (ref 13–44)
MCHC RBC-ENTMCNC: 27.9 PG — SIGNIFICANT CHANGE UP (ref 27–34)
MCHC RBC-ENTMCNC: 31.6 GM/DL — LOW (ref 32–36)
MCV RBC AUTO: 88.3 FL — SIGNIFICANT CHANGE UP (ref 80–100)
MONOCYTES # BLD AUTO: 0.45 K/UL — SIGNIFICANT CHANGE UP (ref 0–0.9)
MONOCYTES NFR BLD AUTO: 6.9 % — SIGNIFICANT CHANGE UP (ref 2–14)
NEUTROPHILS # BLD AUTO: 4.16 K/UL — SIGNIFICANT CHANGE UP (ref 1.8–7.4)
NEUTROPHILS NFR BLD AUTO: 63.3 % — SIGNIFICANT CHANGE UP (ref 43–77)
NRBC # BLD: 0 /100 WBCS — SIGNIFICANT CHANGE UP (ref 0–0)
NRBC # FLD: 0 K/UL — SIGNIFICANT CHANGE UP (ref 0–0)
PLATELET # BLD AUTO: 329 K/UL — SIGNIFICANT CHANGE UP (ref 150–400)
POTASSIUM SERPL-MCNC: 3.5 MMOL/L — SIGNIFICANT CHANGE UP (ref 3.5–5.3)
POTASSIUM SERPL-SCNC: 3.5 MMOL/L — SIGNIFICANT CHANGE UP (ref 3.5–5.3)
PROT SERPL-MCNC: 7.3 G/DL — SIGNIFICANT CHANGE UP (ref 6–8.3)
RBC # BLD: 4.27 M/UL — SIGNIFICANT CHANGE UP (ref 3.8–5.2)
RBC # FLD: 12.6 % — SIGNIFICANT CHANGE UP (ref 10.3–14.5)
RSV RNA NPH QL NAA+NON-PROBE: SIGNIFICANT CHANGE UP
SALICYLATES SERPL-MCNC: 0.6 MG/DL — LOW (ref 15–30)
SARS-COV-2 RNA SPEC QL NAA+PROBE: SIGNIFICANT CHANGE UP
SODIUM SERPL-SCNC: 142 MMOL/L — SIGNIFICANT CHANGE UP (ref 135–145)
TOXICOLOGY SCREEN, DRUGS OF ABUSE, SERUM RESULT: SIGNIFICANT CHANGE UP
TSH SERPL-MCNC: 1.78 UIU/ML — SIGNIFICANT CHANGE UP (ref 0.27–4.2)
WBC # BLD: 6.56 K/UL — SIGNIFICANT CHANGE UP (ref 3.8–10.5)
WBC # FLD AUTO: 6.56 K/UL — SIGNIFICANT CHANGE UP (ref 3.8–10.5)

## 2022-10-11 PROCEDURE — 99284 EMERGENCY DEPT VISIT MOD MDM: CPT

## 2022-10-11 PROCEDURE — 99285 EMERGENCY DEPT VISIT HI MDM: CPT

## 2022-10-11 RX ORDER — ZIPRASIDONE HYDROCHLORIDE 20 MG/1
80 CAPSULE ORAL
Refills: 0 | Status: DISCONTINUED | OUTPATIENT
Start: 2022-10-11 | End: 2022-10-14

## 2022-10-11 RX ORDER — CLONAZEPAM 1 MG
0.5 TABLET ORAL ONCE
Refills: 0 | Status: DISCONTINUED | OUTPATIENT
Start: 2022-10-11 | End: 2022-10-11

## 2022-10-11 RX ORDER — CLONAZEPAM 1 MG
0.5 TABLET ORAL AT BEDTIME
Refills: 0 | Status: DISCONTINUED | OUTPATIENT
Start: 2022-10-11 | End: 2022-10-14

## 2022-10-11 RX ORDER — ZIPRASIDONE HYDROCHLORIDE 20 MG/1
80 CAPSULE ORAL ONCE
Refills: 0 | Status: COMPLETED | OUTPATIENT
Start: 2022-10-11 | End: 2022-10-11

## 2022-10-11 RX ORDER — BUPROPION HYDROCHLORIDE 150 MG/1
150 TABLET, EXTENDED RELEASE ORAL DAILY
Refills: 0 | Status: CANCELLED | OUTPATIENT
Start: 2022-10-12 | End: 2022-10-11

## 2022-10-11 RX ORDER — BUPROPION HYDROCHLORIDE 150 MG/1
150 TABLET, EXTENDED RELEASE ORAL DAILY
Refills: 0 | Status: DISCONTINUED | OUTPATIENT
Start: 2022-10-12 | End: 2022-10-14

## 2022-10-11 RX ORDER — ACETAMINOPHEN 500 MG
650 TABLET ORAL ONCE
Refills: 0 | Status: COMPLETED | OUTPATIENT
Start: 2022-10-11 | End: 2022-10-11

## 2022-10-11 RX ORDER — CLONAZEPAM 1 MG
0.5 TABLET ORAL
Refills: 0 | Status: DISCONTINUED | OUTPATIENT
Start: 2022-10-11 | End: 2022-10-14

## 2022-10-11 RX ORDER — LEVOTHYROXINE SODIUM 125 MCG
25 TABLET ORAL DAILY
Refills: 0 | Status: DISCONTINUED | OUTPATIENT
Start: 2022-10-11 | End: 2022-10-14

## 2022-10-11 RX ADMIN — ZIPRASIDONE HYDROCHLORIDE 80 MILLIGRAM(S): 20 CAPSULE ORAL at 23:09

## 2022-10-11 RX ADMIN — Medication 0.5 MILLIGRAM(S): at 23:10

## 2022-10-11 NOTE — ED BEHAVIORAL HEALTH NOTE - BEHAVIORAL HEALTH NOTE
As per request of provider, writer contacted patient’s mother Rupinder ([phone]) to obtain collateral information. The following information is per the mother.  Patient is a 20 Yo female domiciled w/ mother but recently staying w/ grandmother, hx of borderline personality disorder and depression, employed at a [employer] store, BIB by self due to Si. Mother reports patient has been dealing w/ depression and sees a  psychiatrist and therapist. She reports patient symptoms include being teary and anxious. Mother spoke w/ patient via text today and she appeared okay. Patient did not endorse any si/hi/avh to mother.  Mother reports patient is compliant w/ medication and treatment. No drug or alcohol use reported. Mothe reports patient has been attending work this week at a [employer] and suspects her sleep, hygiene and appetite are normal. Medical problems include hypothyroidism and patient is covid vaccinated x2. Patient has no recent travel or exposure. Mother has no further safety concerns for the patient. As per request of provider, writer contacted patient’s mother Rupinder ([phone]) to obtain collateral information. The following information is per the mother.    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 Mother reports patient has been dealing w/ depression and sees a  psychiatrist and therapist. She reports patient symptoms include being teary and anxious. Mother spoke w/ patient via text today and she appeared okay. Patient did not endorse any si/hi/avh to mother.  Mother reports patient is compliant w/ medication and treatment. No drug or alcohol use reported. Mother reports patient has been attending work this week at a [employer] and suspects her sleep, hygiene and appetite are normal. Medical problems include hypothyroidism and patient is covid vaccinated x2. Patient has no recent travel or exposure. Mother has no further safety concerns for the patient.    Writer informed mother of patient's admission.

## 2022-10-11 NOTE — ED BEHAVIORAL HEALTH ASSESSMENT NOTE - PAST PSYCHOTROPIC MEDICATION
Huseyin, Latuda, Zoloft, Cymbalta Trintillex, Latuda, Zoloft, Cymbalta, Lamictal 150 mg HS, Prozac 80 mg po daily, Klonopin 0.5 mg TID PRN

## 2022-10-11 NOTE — ED ADULT NURSE NOTE - CHIEF COMPLAINT QUOTE
pt ambulatory to walk in triage c/o not feeling safe, increased suicidal thoughts. pt denies hi/ avh, etoh/ drug use. PMH: borderline personality disorder, hyothyroid,

## 2022-10-11 NOTE — ED BEHAVIORAL HEALTH ASSESSMENT NOTE - PSYCHIATRIC ISSUES AND PLAN (INCLUDE STANDING AND PRN MEDICATION)
Wellbutrin XL 150mg daily, Geodon 80mg HS, and Klonopin 0.5mg HS. PRN: Klonopin 0.5mg BID. PRN: ativan 2mg PO/IM q6Hrs
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
## 2022-10-11 NOTE — ED BEHAVIORAL HEALTH ASSESSMENT NOTE - REFERRAL / APPOINTMENT DETAILS
Follow-up with Dr. Gutierrez on 7/27/20 at 2:00 PM; follow-up with therapist Amee Gallego tomorrow (7/23/20)

## 2022-10-11 NOTE — ED BEHAVIORAL HEALTH ASSESSMENT NOTE - NSBHROSSYSTEMS_PSY_ALL_CORE
Pt currently denied experiencing any headaches; has no dizziness, no blurring of vision; no sorethroat; no cough, no fever. no chest pains, no SOB, no palpitations, no abdominal pains, no nausea/ vomiting/ diarrhea, no dysuria, no hesitancy, no arthralgia/ no pruritus. denied any muscle/ joint pains
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
## 2022-10-11 NOTE — ED ADULT TRIAGE NOTE - PRO INTERPRETER NEED 2
Pt informed.     Future Appointments   Date Time Provider Cora Palafox   3/23/2021  3:00 PM Juan Vail MD [facility]   4/6/2021  8:30 AM REF [facility]   4/15/2021  9:30 AM Linda Mcdonough MD [facility]

## 2022-10-11 NOTE — ED BEHAVIORAL HEALTH NOTE - BEHAVIORAL HEALTH NOTE
COVID Exposure Screen- Patient  1.	*Have you had a COVID-19 test in the last 90 days?  (  ) Yes   ( X ) No   (  ) Unknown- Reason: _____  IF YES PROCEED TO QUESTION #2. IF NO OR UNKNOWN, PLEASE SKIP TO QUESTION #3.  2.	Date of test(s) and result(s): ________  3.	*Have you tested positive for COVID-19 antibodies? (  ) Yes   ( X ) No   (  ) Unknown- Reason: _____  IF YES PROCEED TO QUESTION #4. IF NO or UNKNOWN, PLEASE SKIP TO QUESTION #5.  4.	Date of positive antibody test: ________  5.	*Have you received 2 doses of the COVID-19 vaccine? (  ) Yes   ( X ) No   (  ) Unknown- Reason: _____   IF YES PROCEED TO QUESTION #6. IF NO or UNKNOWN, PLEASE SKIP TO QUESTION #7.  6.	Date of second dose: ________ received J [employer] J vaccine last month   7.	*In the past 10 days, have you been around anyone with a positive COVID-19 test?* (  ) Yes   ( X ) No   (  ) Unknown- Reason: ____  IF YES PROCEED TO QUESTION #8. IF NO or UNKNOWN, PLEASE SKIP TO QUESTION #13.  8.	Were you within 6 feet of them for at least 15 minutes? (  ) Yes   (  ) No   (  ) Unknown- Reason: _____  9.	Have you provided care for them? (  ) Yes   (  ) No   (  ) Unknown- Reason: ______  10.	Have you had direct physical contact with them (touched, hugged, or kissed them)? (  ) Yes   (  ) No    (  ) Unknown- Reason: _____  11.	Have you shared eating or drinking utensils with them? (  ) Yes   (  ) No    (  ) Unknown- Reason: ____  12.	Have they sneezed, coughed, or somehow gotten respiratory droplets on you? (  ) Yes   (  ) No    (  ) Unknown- Reason: ______  13.	*Have you been out of [place] within the past 10 days?* (  ) Yes   ( X ) No   (  ) Unknown- Reason: _____  IF YES PLEASE ANSWER THE FOLLOWING QUESTIONS:  14.	Which state/country have you been to? ______  15.	Were you there over 24 hours? (  ) Yes   (  ) No    (  ) Unknown- Reason: ______  16.	Date of return to [facility]: ______

## 2022-10-11 NOTE — ED PROVIDER NOTE - OBJECTIVE STATEMENT
This is a 21 yr old F, pmh pcos, hypothyroid, vasovagal, borderline with c/o increased anxiety and si with a plan to use scissors or razors. Pt reports she moved in with her grandmother about 2 month ago because her older sister moved back home and they do not get along. She expresses sad about a boy who she used to talk to and he does not talk to her anymore. Reports does not feel safe at home "alone" , grandmother is 90 years old and she can not watch her 24/7.

## 2022-10-11 NOTE — ED ADULT TRIAGE NOTE - NS_BH TRG Q4YES_ED_ALL_ED
Constitutional: non-toxic, eyes closed,  nonverbal.  but does furrow eyebrows as pain response.   HEENT: eomi,  wnl  Respiratory:  lung sounds b/l;   Cardiovascular:  s1, s2,  regular, no murmurs  Gastrointestinal:  nontender, nondistended, +bowel sounds  Extremities: no edema b/l le  Neurological: nonverbal at baseline.  unable to fully assess.     ------------------------------------------------------------------     VITAL SIGNS   T(F): 97.4 (03-23 @ 18:19), Max: 98.1 (03-23 @ 16:29)  HR: 83 (03-23 @ 18:19)  BP: 144/75 (03-23 @ 18:19)  RR: 18 (03-23 @ 18:19)  SpO2: 97% (03-23 @ 16:29)
Past 24 hrs

## 2022-10-11 NOTE — ED BEHAVIORAL HEALTH ASSESSMENT NOTE - OTHER
JORGE RUBIO Reference #: [id number] - prescribed last 09/29/2022 and filled on 10/02/2022 with	clonazepam 0.5 mg x 90 tablets for 	30 days by	Bhavesh Garcia	[id number]	[facility] [id number] "more depressed" pt refused to complete safety plan form but she engaged in verbal safety planning conflict with older sister; recent relocation to grandmother's place; recent failed relationship hair dyed green, appeared as stated age passive SI - no intent or plans currently; no HI tearful distracted
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
## 2022-10-11 NOTE — ED PROVIDER NOTE - CLINICAL SUMMARY MEDICAL DECISION MAKING FREE TEXT BOX
This is a 21 yr old F, pmh pcos, hypothyroid, vasovagal, borderline with c/o increased anxiety and si with a plan to use scissors or razors. Pt reports she moved in with her grandmother about 2 month ago because her older sister moved back home and they do not get along. She expresses sad about a boy who she used to talk to and he does not talk to her anymore. Reports does not feel safe at home "alone" , grandmother is 90 years old and she can not watch her 24/7.  labs- unremarkable  psych - voluntary adm

## 2022-10-11 NOTE — ED BEHAVIORAL HEALTH ASSESSMENT NOTE - RISK ASSESSMENT
Protective factors include no suicide attempts, no violence history, medication compliance, no access to guns, no global insomnia, no substance abuse, supportive family, willingness to seek help, no suicidal ideation or homicidal ideation, identifies reasons for living.  Risk factors include history of self-harming (cutting) in past, borderline personality disorder, h/o suicide in maternal aunt.   Pt is not at acutely elevated risk of harm to self or others. Low Acute Suicide Risk At this time given all risks taken into consideration, the Pt is at moderate risk for self harm/ suicide as well as being at chronically elevated risk of harming self.  Pt's current presentation is not be deemed dischargeable back to the community.  Current increased in risks can be mitigated by a psychiatric admission with supervision, continuing psych meds with titration towards efficacious dosing range Moderate Acute Suicide Risk

## 2022-10-11 NOTE — ED BEHAVIORAL HEALTH ASSESSMENT NOTE - VIOLENCE PROTECTIVE FACTORS:
Residential stability/Relationship stability/Sobriety/Engagement in treatment Residential stability/Relationship stability/Sobriety/Engagement in treatment/Insight into violence risk and need for management/treatment

## 2022-10-11 NOTE — ED ADULT TRIAGE NOTE - CHIEF COMPLAINT QUOTE
pt ambulatory to walk in triage c/o not feeling safe, increased suicidal thoughts. pt denies hi/ avh. PMH: borderline personality disorder, hyothyroid, pt ambulatory to walk in triage c/o not feeling safe, increased suicidal thoughts. pt denies hi/ avh, etoh/ drug use. PMH: borderline personality disorder, hyothyroid,

## 2022-10-11 NOTE — ED BEHAVIORAL HEALTH ASSESSMENT NOTE - CURRENT MEDICATION
Lamictal 150 mg po QHS, Prozac 80 mg po daily, Klonopin 0.5 mg po TID prn anxiety, Leucovorin 25 mg po daily, Macrobid for UTI (unknown dose), Augmentin for strep throat (unknown dose) Wellbutrin XL 150mg daily, Geodon 80mg HS, Klonopin 0.5mg HS, Klonopin 0.5mg BID PRN
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
## 2022-10-11 NOTE — ED BEHAVIORAL HEALTH ASSESSMENT NOTE - DESCRIPTION
hypothyroidism calm, cooperative, in good behavioral control    Vital Signs Last 24 Hrs  T(C): 36.8 (22 Jul 2020 17:40), Max: 36.8 (22 Jul 2020 17:40)  T(F): 98.2 (22 Jul 2020 17:40), Max: 98.2 (22 Jul 2020 17:40)  HR: 92 (22 Jul 2020 17:40) (92 - 92)  BP: 118/70 (22 Jul 2020 17:40) (118/70 - 118/70)  BP(mean): --  RR: 16 (22 Jul 2020 17:40) (16 - 16)  SpO2: 100% (22 Jul 2020 17:40) (100% - 100%) see HPI calm, cooperative, in good behavioral control    Vital Signs Last 24 Hrs  T(C): 36.5 (11 Oct 2022 23:19), Max: 37.4 (11 Oct 2022 20:11)  T(F): 97.7 (11 Oct 2022 23:19), Max: 99.3 (11 Oct 2022 20:11)  HR: 90 (11 Oct 2022 23:19) (90 - 123)  BP: 110/71 (11 Oct 2022 23:19) (110/71 - 130/69)  BP(mean): --  RR: 18 (11 Oct 2022 23:19) (16 - 18)  SpO2: 100% (11 Oct 2022 23:19) (100% - 100%)    Parameters below as of 11 Oct 2022 23:19  Patient On (Oxygen Delivery Method): room air single, no children. used to live with mother until recently, she relocated to grandmother's house - has ongoing conflict with her older sister (who has also recently relocated from [place]). Pt recently graduated from [employer] school - last spring. 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 used to like reading and writer as well as being with friends as well as going to concerts (not now, due to ongoing worsening depression and anxiety symptoms).  not Taoism. has cat named "Bubbles". no reported access to guns hypothyroidism, PCOS and reported "vasovagal syndrome". current meds: tri-sprintec 0.25mg daily, synthroid 25mcg daily and NaCl 1 tab daily

## 2022-10-12 DIAGNOSIS — F41.9 ANXIETY DISORDER, UNSPECIFIED: ICD-10-CM

## 2022-10-12 DIAGNOSIS — F32.9 MAJOR DEPRESSIVE DISORDER, SINGLE EPISODE, UNSPECIFIED: ICD-10-CM

## 2022-10-12 DIAGNOSIS — F60.3 BORDERLINE PERSONALITY DISORDER: ICD-10-CM

## 2022-10-12 LAB
COVID-19 SPIKE DOMAIN AB INTERP: POSITIVE
COVID-19 SPIKE DOMAIN ANTIBODY RESULT: 10.3 U/ML — HIGH
SARS-COV-2 IGG+IGM SERPL QL IA: 10.3 U/ML — HIGH
SARS-COV-2 IGG+IGM SERPL QL IA: POSITIVE

## 2022-10-12 RX ORDER — SODIUM CHLORIDE 9 MG/ML
1 INJECTION INTRAMUSCULAR; INTRAVENOUS; SUBCUTANEOUS DAILY
Refills: 0 | Status: DISCONTINUED | OUTPATIENT
Start: 2022-10-12 | End: 2022-10-14

## 2022-10-12 RX ADMIN — ZIPRASIDONE HYDROCHLORIDE 80 MILLIGRAM(S): 20 CAPSULE ORAL at 21:17

## 2022-10-12 RX ADMIN — SODIUM CHLORIDE 1 GRAM(S): 9 INJECTION INTRAMUSCULAR; INTRAVENOUS; SUBCUTANEOUS at 10:53

## 2022-10-12 RX ADMIN — Medication 0.5 MILLIGRAM(S): at 21:16

## 2022-10-12 RX ADMIN — Medication 25 MICROGRAM(S): at 06:08

## 2022-10-12 RX ADMIN — BUPROPION HYDROCHLORIDE 150 MILLIGRAM(S): 150 TABLET, EXTENDED RELEASE ORAL at 10:53

## 2022-10-12 NOTE — DIETITIAN INITIAL EVALUATION ADULT - FACTORS AFF FOOD INTAKE
hx of restrictive eating disorder/persistent lack of appetite/Taoism/ethnic/cultural/personal food preferences

## 2022-10-12 NOTE — BH INPATIENT PSYCHIATRY ASSESSMENT NOTE - CURRENT MEDICATION
MEDICATIONS  (STANDING):  buPROPion XL (24-Hour) . 150 milliGRAM(s) Oral daily  clonazePAM  Tablet 0.5 milliGRAM(s) Oral at bedtime  levothyroxine 25 MICROGram(s) Oral daily  sodium chloride 1 Gram(s) Oral daily  ziprasidone 80 milliGRAM(s) Oral <User Schedule>    MEDICATIONS  (PRN):  clonazePAM  Tablet 0.5 milliGRAM(s) Oral two times a day PRN anxiety  LORazepam     Tablet 2 milliGRAM(s) Oral every 6 hours PRN Agitation  LORazepam   Injectable 2 milliGRAM(s) IntraMuscular Once PRN severe agitation

## 2022-10-12 NOTE — BH PATIENT PROFILE - STATED REASON FOR ADMISSION
Pt tstaes,I didnot feel safe at home alone with grandma and had suicidal thoughts ,so I took an uber to hospital."

## 2022-10-12 NOTE — BH INPATIENT PSYCHIATRY ASSESSMENT NOTE - PAST PSYCHOTROPIC MEDICATION
Trintillex, Latuda, Zoloft, Cymbalta, Lamictal 150 mg HS, Prozac 80 mg po daily, Klonopin 0.5 mg TID PRN

## 2022-10-12 NOTE — BH INPATIENT PSYCHIATRY ASSESSMENT NOTE - NSBHCONSBHPROVDETAILS_PSY_A_CORE  FT
writer called NP Bhavesh Garcia and left a message and call-back number with the .  notified him but he is at hospital all day. will call him again tomorrow

## 2022-10-12 NOTE — BH PATIENT PROFILE - NSPROMEDSDISPOSITION_GEN_A_NUR
pharmacy is closed at this time, kept in narc cabinet at med room and will endorsed to next shift./sent to pharmacy

## 2022-10-12 NOTE — DIETITIAN INITIAL EVALUATION ADULT - PERTINENT LABORATORY DATA
10-11    142  |  104  |  5<L>  ----------------------------<  107<H>  3.5   |  24  |  0.87    Ca    9.1      11 Oct 2022 21:40    TPro  7.3  /  Alb  4.3  /  TBili  0.3  /  DBili  x   /  AST  16  /  ALT  14  /  AlkPhos  80  10-11

## 2022-10-12 NOTE — BH INPATIENT PSYCHIATRY ASSESSMENT NOTE - OTHER PAST PSYCHIATRIC HISTORY (INCLUDE DETAILS REGARDING ONSET, COURSE OF ILLNESS, INPATIENT/OUTPATIENT TREATMENT)
reported hx of depression, anxiety, eating disorder and borderline PD  no hx of psych admissions  1 interrupted SA via drinking bleach at age 13  prior hx of cutting behavior - 1st cut: 10; last cut: 14  current BHPs: Dr Bhavesh Garcia - being seen qmonthly; last seen on 9/29/2022  CBT therapist Amee Gallego as well as Somatic therapist Shin Chappell - both of which are scheduled qTuesdays

## 2022-10-12 NOTE — BH SOCIAL WORK INITIAL PSYCHOSOCIAL EVALUATION - NSPTSTATEDGOAL_PSY_ALL_CORE
Patient seeks psychiatric stabilization to return to the community with appropriate aftercare and supports.

## 2022-10-12 NOTE — BH SOCIAL WORK INITIAL PSYCHOSOCIAL EVALUATION - DETAILS
Pt reports her father has hx of polysubstance use (ETOH, cocaine) but has been sober for 31 years. As per ED note and pt, pt's mother has hx of MDD and her father has hx of polysubstance use (cocaine, ETOH) but has been sober for 31 years.

## 2022-10-12 NOTE — BH INPATIENT PSYCHIATRY ASSESSMENT NOTE - RISK ASSESSMENT
At this time given all risks taken into consideration, the Pt is at moderate risk for self harm/ suicide as well as being at chronically elevated risk of harming self.  Pt's current presentation is not be deemed dischargeable back to the community.  Current increased in risks can be mitigated by a psychiatric admission with supervision, continuing psych meds with titration towards efficacious dosing range

## 2022-10-12 NOTE — BH INPATIENT PSYCHIATRY ASSESSMENT NOTE - NSBHATTESTATTENDBILLTIME_PSY_A_CORE
I attest my time as attending is greater than KELLIE time spent on qualifying patient care activities.

## 2022-10-12 NOTE — DIETITIAN INITIAL EVALUATION ADULT - OTHER INFO
Patient is a 20 y/o female with self-reported hx of depression, anxiety, eating disorder and Borderline Personality Disorder. has no hx of in-pt psych admissions, has prior hx of interrupted SA (drinking bleach, age 13) as well as prior cutting behavior. denied any hx of illicit substance use including alcohol.  PMHx of hypothyroidism, PCOS and vasovagal syndrome. Patient presented to ED due to worsening symptoms of depression, anxiety along with SI.    Met with patient in her room. Patient reports current appetite remains fair, with PO intake ~50% at meals today, denies chewing/swallowing difficulties at meals. Patient c/o "some" nausea with abd pain, but denies vomit, constipation, or diarrhea. Patient denies hx of GI disorder, and states PO intake does not worsen her abd pain/nausea. Food preferences taken and implemented. Patient declined nutrition supplement offered. Writer encouraged po intake as tolerated, and discussed healthy eating with My[employer] handouts given, patient verbalized understanding.
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
## 2022-10-12 NOTE — BH INPATIENT PSYCHIATRY ASSESSMENT NOTE - NSICDXPASTMEDICALHX_GEN_ALL_CORE_FT
PAST MEDICAL HISTORY:  Anxiety     Asthma     Bipolar disorder     Depression     Hypothyroid     PCOS (polycystic ovarian syndrome)     Vasovagal syncope

## 2022-10-12 NOTE — PSYCHIATRIC REHAB INITIAL EVALUATION - NSBHPRRECOMMEND_PSY_ALL_CORE
The writer met with the patient to orient her to psychiatric rehabilitation staff and services. As per the patient’s chart, she presented to the ED due to worsening symptoms of depression, anxiety along with SI and a plan to cut her wrist using either scissors or blades. The patient stated due to her having BPD and being in distress and “instead of just” using her coping skills, she brought herself to the hospital. The patient put in a 3DL and stated that she is feeling well to go home because she knows the skills she can use. The writer established a psychiatric rehabilitation goal for the patient to work on over the next seven days. The patient’s psychiatric rehabilitation goal is to identify and utilize 2 coping skills that meet her needs for coping ineffective goal. Over the next seven days, Psychiatric Rehabilitation staff will utilize individual psychotherapy and psychoeducation to assist the patient in reaching her treatment goal. The patient denied SI/HI/AH/VH.

## 2022-10-12 NOTE — BH TREATMENT PLAN - NSTXDCOTHRINTERSW_PSY_ALL_CORE
SW will provide patient and her spouse/family with support, psycho-education, and discharge planning; while coordinating care with interdisciplinary treatment team.

## 2022-10-12 NOTE — BH PATIENT PROFILE - NSPROPTRIGHTSUPPORTPERSON_GEN_A_NUR
Alzheimer disease    Chronic back pain    COPD (chronic obstructive pulmonary disease)    Hypothyroid    Smoker    Wound Alzheimer disease    Anxiety    Chronic back pain    COPD (chronic obstructive pulmonary disease)    HTN (hypertension)    Hypothyroid    Smoker    Wound yes

## 2022-10-12 NOTE — BH PATIENT PROFILE - FALL HARM RISK - UNIVERSAL INTERVENTIONS
Bed in lowest position, wheels locked, appropriate side rails in place/Call bell, personal items and telephone in reach/Instruct patient to call for assistance before getting out of bed or chair/Non-slip footwear when patient is out of bed/[place] to call system/Physically safe environment - no spills, clutter or unnecessary equipment/Purposeful Proactive Rounding/Room/bathroom lighting operational, light cord in reach

## 2022-10-12 NOTE — DIETITIAN INITIAL EVALUATION ADULT - ADD RECOMMEND
1. May consider adding MVI for micronutrient coverage when medically appropriate. 2. Monitor PO intake/tolerance, weights, labs, BM's, and skin integrity. 3. Encourage po intake as tolerated and honor food preferences PRN.

## 2022-10-12 NOTE — DIETITIAN INITIAL EVALUATION ADULT - ORAL INTAKE PTA/DIET HISTORY
Patient reports appetite has been fair PTA 2/2 depression and anxiety, occasional nausea but no vomit, on NaCl tab to replete sodium 2/2 vasovagal syndrome. Patient endorses hx of restrictive eating disorder during her teenage years which resolved when she was around 19 y/o, denies purging. She used to have high intake of fast foods but recently she has been trying to eat healthier and cut down on sodas/sugary foods. Reports wt loss of 10lb in 2 months. Allergic to pineapple and chocolate per patient. Dislikes beef/pork.

## 2022-10-12 NOTE — BH INPATIENT PSYCHIATRY ASSESSMENT NOTE - NSBHASSESSSUMMFT_PSY_ALL_CORE
21/F with reported hx of depression, anxiety, eating disorder and BPD; has no hx of psych admissions; with 1 prior interrupted SA via drinking bleach at age 13 + prior hx of cutting behavior. Pt has no hx of illicit substance use. currently in psych treatment + meds. tonight, presented to the ED due to worsening depression/anxiety and SI with plan.     at this time, reports worsening symptoms of BPD amidst reported good compliance to meds along with therapy. denies recent depresssive symptoms. on day of admission, had distressing SI but denies intent or plan. cites current stressors of ongoing conflict with older sister, recent relocation to grandmother's house as well as recent failed friendship. pt's presentation is likely stemming from BPD      patient is emotionally regulated today, in behavioral control, plan below.  Patient requires continued inpatient psychiatric hospitalization for safety and stabilization.  1. admit to inpatient psych, under voluntary status   2. routine checks, constant observation not needed as patient denies SI  3. continue outpatient psych medications: Bupropion XL 150mg 1 a day, Geodon ziprazadone 80mg 2x a day, Clonopin .5mg 3 a day clonazepam  4. continue outpatient medications: Synthroid 25micro grams 1x a day, NaCl 1g 1x a day. patient reports doesn't need Trispintrec  5. will attend social, milieu groups  6. dispo when clinically stable  21/F with reported hx of depression, anxiety, eating disorder and BPD; has no hx of psych admissions; with 1 prior interrupted SA via drinking bleach at age 13 + prior hx of cutting behavior. Pt has no hx of illicit substance use. currently in psych treatment + meds. tonight, presented to the ED due to worsening depression/anxiety and SI with plan.     at this time, reports worsening symptoms of BPD amidst reported good compliance to meds along with therapy. denies recent depressive symptoms. on day of admission, had distressing SI but denies intent or plan. cites current stressors of ongoing conflict with older sister, recent relocation to grandmother's house as well as recent failed friendship. pt's presentation is likely stemming from BPD      patient is emotionally regulated today, in behavioral control, plan below.  Patient requires continued inpatient psychiatric hospitalization for safety and stabilization.  1. admit to inpatient psych, under voluntary status   2. routine checks; constant observation not needed as patient denies SI  3. continue outpatient psych medications: Bupropion XL 150mg 1 a day, Geodon ziprazadone 80mg 2x a day, Clonopin .5mg 3 a day clonazepam  4. continue outpatient medications: Synthroid 25micro grams 1x a day, NaCl 1g 1x a day. patient reports doesn't need Trispintrec here  5. will attend social, milieu groups  6. dispo when clinically stable  21/F with reported hx of depression, anxiety, eating disorder and BPD; has no hx of psych admissions; with 1 prior interrupted SA via drinking bleach at age 13 + prior hx of cutting behavior. Pt has no hx of illicit substance use. currently in psych treatment + meds. tonight, presented to the ED due to worsening depression/anxiety and SI with plan.     at this time, reports worsening symptoms of BPD amidst reported good compliance to meds along with therapy. denies recent depressive symptoms. on day of admission, had distressing SI but denies intent or plan. cites current stressors of ongoing conflict with older sister, recent relocation to grandmother's house as well as recent failed friendship. pt's presentation is likely stemming from BPD      patient is emotionally regulated today, in behavioral control, plan below.  Patient requires continued inpatient psychiatric hospitalization for safety and stabilization.  1. admit to inpatient psych, under voluntary status   2. routine checks; constant observation not needed as patient denies SI  3. continue outpatient psych medications: Bupropion XL 150mg 1 a day, Geodon ziprazadone 80mg 2x a day, Klonopin .5mg 3 a day clonazepam  4. continue outpatient medications: Synthroid 25micro grams 1x a day, NaCl 1g 1x a day. patient reports doesn't need Trispintrec here  5. will attend social, milieu groups  6. dispo when clinically stable

## 2022-10-12 NOTE — BH INPATIENT PSYCHIATRY ASSESSMENT NOTE - NSBHATTESTCOMMENTATTENDFT_PSY_A_CORE
21/F with reported hx of depression, anxiety, eating disorder and BPD; has no hx of psych admissions; with 1 prior interrupted SA via drinking bleach at age 13 + prior hx of cutting behavior. Pt has no hx of illicit substance use. currently in psych treatment + meds. tonight, presented to the ED due to worsening depression/anxiety and SI with plan.     at this time, reports worsening symptoms of BPD amidst reported good compliance to meds along with therapy. denies recent depressive symptoms. on day of admission, had distressing SI but denies intent or plan. cites current stressors of ongoing conflict with older sister, recent relocation to grandmother's house as well as recent failed friendship. pt's presentation is likely stemming from BPD      patient is emotionally regulated today, in behavioral control, plan below.  Patient requires continued inpatient psychiatric hospitalization for safety and stabilization.  Monitor on routine checks, no CO indicated.  Agree with plan as stated.

## 2022-10-12 NOTE — BH INPATIENT PSYCHIATRY ASSESSMENT NOTE - NSBHMETABOLIC_PSY_ALL_CORE_FT
BMI: BMI (kg/m2): 24.8 (10-12-22 @ 00:13)  HbA1c:   Glucose: POCT Blood Glucose.: 103 mg/dL (01-12-22 @ 21:07)    BP: 96/60 (10-12-22 @ 00:13) (96/60 - 130/69)  Lipid Panel:  BMI: BMI (kg/m2): 24.8 (10-12-22 @ 00:13)  HbA1c: A1C with Estimated Average Glucose Result: 5.2 % (10-13-22 @ 08:48)    Glucose: POCT Blood Glucose.: 103 mg/dL (01-12-22 @ 21:07)    BP: 96/60 (10-12-22 @ 00:13) (96/60 - 110/71)  Lipid Panel: Date/Time: 10-13-22 @ 08:48  Cholesterol, Serum: 186  Direct LDL: --  HDL Cholesterol, Serum: 48  Total Cholesterol/HDL Ration Measurement: --  Triglycerides, Serum: 198

## 2022-10-12 NOTE — BH INPATIENT PSYCHIATRY ASSESSMENT NOTE - ADDITIONAL DETAILS ALL
reported hx of being interrupted by mother: attempted to drink bleach at age 13. hx of cutting - 1st cut at 10 and last cut at 14

## 2022-10-12 NOTE — BH INPATIENT PSYCHIATRY ASSESSMENT NOTE - NSBHCHARTREVIEWVS_PSY_A_CORE FT
Vital Signs Last 24 Hrs  T(C): 36.3 (10-12-22 @ 08:21), Max: 37.4 (10-11-22 @ 20:11)  T(F): 97.4 (10-12-22 @ 08:21), Max: 99.3 (10-11-22 @ 20:11)  HR: 120 (10-12-22 @ 00:13) (90 - 123)  BP: 96/60 (10-12-22 @ 00:13) (96/60 - 130/69)  BP(mean): --  RR: 18 (10-12-22 @ 08:21) (16 - 18)  SpO2: 100% (10-11-22 @ 23:19) (100% - 100%)    Orthostatic VS  10-12-22 @ 08:21  Lying BP: --/-- HR: --  Sitting BP: 99/69 HR: 112  Standing BP: 101/69 HR: 100  Site: --  Mode: --  Orthostatic VS  10-12-22 @ 07:00  Lying BP: --/-- HR: --  Sitting BP: 96/61 HR: 115  Standing BP: 95/57 HR: 118  Site: --  Mode: --   Vital Signs Last 24 Hrs  T(C): --  T(F): --  HR: --  BP: --  BP(mean): --  RR: --  SpO2: --    Orthostatic VS  10-13-22 @ 08:29  Lying BP: --/-- HR: --  Sitting BP: 115/68 HR: 89  Standing BP: 113/60 HR: 95  Site: --  Mode: --

## 2022-10-12 NOTE — BH INPATIENT PSYCHIATRY ASSESSMENT NOTE - DESCRIPTION
single, no children. used to live with mother until recently, she relocated to grandmother's house - has ongoing conflict with her older sister (who has also recently relocated from [place]). Pt recently graduated from [employer] school - last spring. currently employed as part time staff for a local cosmetic shop in [facility]. used to like reading and writer as well as being with friends as well as going to concerts (not now, due to ongoing worsening depression and anxiety symptoms).  not Mormon. has cat named "Bubbles". no reported access to guns. recently lost a friendship, which made her sad
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
## 2022-10-13 ENCOUNTER — TRANSCRIPTION ENCOUNTER (OUTPATIENT)
Age: 22
End: 2022-10-13

## 2022-10-13 VITALS — TEMPERATURE: 98 F

## 2022-10-13 LAB
A1C WITH ESTIMATED AVERAGE GLUCOSE RESULT: 5.2 % — SIGNIFICANT CHANGE UP (ref 4–5.6)
CHOLEST SERPL-MCNC: 186 MG/DL — SIGNIFICANT CHANGE UP
ESTIMATED AVERAGE GLUCOSE: 103 — SIGNIFICANT CHANGE UP
HDLC SERPL-MCNC: 48 MG/DL — LOW
LIPID PNL WITH DIRECT LDL SERPL: 98 MG/DL — SIGNIFICANT CHANGE UP
NON HDL CHOLESTEROL: 138 MG/DL — HIGH
TRIGL SERPL-MCNC: 198 MG/DL — HIGH

## 2022-10-13 RX ADMIN — SODIUM CHLORIDE 1 GRAM(S): 9 INJECTION INTRAMUSCULAR; INTRAVENOUS; SUBCUTANEOUS at 08:39

## 2022-10-13 RX ADMIN — Medication 0.5 MILLIGRAM(S): at 21:30

## 2022-10-13 RX ADMIN — BUPROPION HYDROCHLORIDE 150 MILLIGRAM(S): 150 TABLET, EXTENDED RELEASE ORAL at 08:39

## 2022-10-13 RX ADMIN — Medication 25 MICROGRAM(S): at 06:30

## 2022-10-13 RX ADMIN — ZIPRASIDONE HYDROCHLORIDE 80 MILLIGRAM(S): 20 CAPSULE ORAL at 21:30
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
## 2022-10-13 NOTE — BH INPATIENT PSYCHIATRY PROGRESS NOTE - NSBHCHARTREVIEWVS_PSY_A_CORE FT
Vital Signs Last 24 Hrs  T(C): 36.5 (10-13-22 @ 08:29), Max: 37 (10-12-22 @ 17:28)  T(F): 97.7 (10-13-22 @ 08:29), Max: 98.6 (10-12-22 @ 17:28)  HR: --  BP: --  BP(mean): --  RR: 17 (10-13-22 @ 08:29) (17 - 17)  SpO2: --    Orthostatic VS  10-13-22 @ 08:29  Lying BP: --/-- HR: --  Sitting BP: 115/68 HR: 89  Standing BP: 113/60 HR: 95  Site: --  Mode: --  Orthostatic VS  10-12-22 @ 08:21  Lying BP: --/-- HR: --  Sitting BP: 99/69 HR: 112  Standing BP: 101/69 HR: 100  Site: --  Mode: --  Orthostatic VS  10-12-22 @ 07:00  Lying BP: --/-- HR: --  Sitting BP: 96/61 HR: 115  Standing BP: 95/57 HR: 118  Site: --  Mode: --   Vital Signs Last 24 Hrs  T(C): --  T(F): --  HR: --  BP: --  BP(mean): --  RR: --  SpO2: --    Orthostatic VS  10-13-22 @ 08:29  Lying BP: --/-- HR: --  Sitting BP: 115/68 HR: 89  Standing BP: 113/60 HR: 95  Site: --  Mode: --

## 2022-10-13 NOTE — BH DISCHARGE NOTE NURSING/SOCIAL WORK/PSYCH REHAB - NSDCPRRECOMMEND_PSY_ALL_CORE
Psychiatric Rehabilitation staff recommends that the patient engage in outpatient services for continued medication and symptom management.

## 2022-10-13 NOTE — BH INPATIENT PSYCHIATRY PROGRESS NOTE - NSCGISEVERILLNESS_PSY_ALL_CORE
5 = Markedly ill - intrusive symptoms that distinctly impair social/occupational function or cause intrusive levels of distress 4 = Moderately ill – overt symptoms causing noticeable, but modest, functional impairment or distress; symptom level may warrant medication

## 2022-10-13 NOTE — BH DISCHARGE NOTE NURSING/SOCIAL WORK/PSYCH REHAB - DISCHARGE INSTRUCTIONS AFTERCARE APPOINTMENTS
In order to check the location, date, or time of your aftercare appointment, please refer to your Discharge Instructions Document given to you upon leaving the hospital.  If you have lost the instructions please call [phone]

## 2022-10-13 NOTE — BH DISCHARGE NOTE NURSING/SOCIAL WORK/PSYCH REHAB - PATIENT PORTAL LINK FT
You can access the FollowMyHealth Patient Portal offered by [facility] by registering at the following website: [email or web address]. By joining [facility]’s FollowMyHealth portal, you will also be able to view your health information using other applications (apps) compatible with our system.

## 2022-10-13 NOTE — BH DISCHARGE NOTE NURSING/SOCIAL WORK/PSYCH REHAB - NSCDUDCCRISIS_PSY_A_CORE
[facility]  1 [phone]) [facility]-WELL ([phone])  Text "WELL" to [phone]  Website: www.[employer]/.[facility] 1 (803) 621-HOPE (4218) Website: www.safehorizon.org/.National Suicide Prevention Lifeline 0 (390) 715-8450/.  [facility]  1 (592) LIFENET (543-8878)/.  [facility]  [phone] [phone]/.  [facility]al Health Helpline / [facility]  [phone] 227-TALK ([phone])/.  [facility]  [address] [place] [phone]   Hours:  Monday through Friday from 9 AM to 3 PM/988 Suicide and Crisis Lifeline

## 2022-10-13 NOTE — BH INPATIENT PSYCHIATRY PROGRESS NOTE - NSBHMETABOLIC_PSY_ALL_CORE_FT
BMI: BMI (kg/m2): 24.8 (10-12-22 @ 00:13)  HbA1c: A1C with Estimated Average Glucose Result: 5.2 % (10-13-22 @ 08:48)    Glucose: POCT Blood Glucose.: 103 mg/dL (01-12-22 @ 21:07)    BP: 96/60 (10-12-22 @ 00:13) (96/60 - 130/69)  Lipid Panel: Date/Time: 10-13-22 @ 08:48  Cholesterol, Serum: 186  Direct LDL: --  HDL Cholesterol, Serum: 48  Total Cholesterol/HDL Ration Measurement: --  Triglycerides, Serum: 198   BMI: BMI (kg/m2): 24.8 (10-12-22 @ 00:13)  HbA1c: A1C with Estimated Average Glucose Result: 5.2 % (10-13-22 @ 08:48)    Glucose: POCT Blood Glucose.: 103 mg/dL (01-12-22 @ 21:07)    BP: 96/60 (10-12-22 @ 00:13) (96/60 - 110/71)  Lipid Panel: Date/Time: 10-13-22 @ 08:48  Cholesterol, Serum: 186  Direct LDL: --  HDL Cholesterol, Serum: 48  Total Cholesterol/HDL Ration Measurement: --  Triglycerides, Serum: 198

## 2022-10-13 NOTE — BH DISCHARGE NOTE NURSING/SOCIAL WORK/PSYCH REHAB - NSDCPRGOAL_PSY_ALL_CORE
Writer met with patient to safety plan for discharge and discuss the patient’s progress throughout her inpatient stay. The patient was receptive to safety planning and identified coping skills, warning signs, social support, and reasons for living. Upon admission, the patient self-presented to the ED due to worsening symptoms of depression, anxiety along with SI and a plan to cut her wrist using scissors or blades.  The patient minimally met her psychiatric rehabilitation goal to identify and utilize 2 coping skills that meet her needs for her coping ineffective goal. The patient watch tv with my family, go to the gym, see a friend, and deep breathing as her coping skills. The patient did not engage with her peers and staff throughout her stay, attended one Psychiatric Rehabilitation group, and minimally utilized her coping skills. The patient has fair ADL’s and is compliant with her medications. The patient denies SI/HI/VH/AH. The patient attended one Psychiatric Rehabilitation groups which was on leisure. The patient has fair ADL’s and is compliant with her medications. The patient denies SI/HI/VH/AH. The patient reported she feels confident and that she is looking forward to going back to the gym, seeing her cat, and seeing her friends. Psychiatric Rehabilitation staff recommends that the patient engage in outpatient services for continued medication and symptom management.

## 2022-10-14 RX ORDER — ZIPRASIDONE HYDROCHLORIDE 20 MG/1
1 CAPSULE ORAL
Qty: 0 | Refills: 0 | DISCHARGE
Start: 2022-10-14

## 2022-10-14 RX ORDER — SODIUM CHLORIDE 9 MG/ML
1 INJECTION INTRAMUSCULAR; INTRAVENOUS; SUBCUTANEOUS
Qty: 0 | Refills: 0 | DISCHARGE
Start: 2022-10-14

## 2022-10-14 RX ORDER — BUPROPION HYDROCHLORIDE 150 MG/1
1 TABLET, EXTENDED RELEASE ORAL
Qty: 0 | Refills: 0 | DISCHARGE
Start: 2022-10-14

## 2022-10-14 RX ORDER — CLONAZEPAM 1 MG
1 TABLET ORAL
Qty: 0 | Refills: 0 | DISCHARGE
Start: 2022-10-14

## 2022-10-14 RX ORDER — LEVOTHYROXINE SODIUM 125 MCG
1 TABLET ORAL
Qty: 0 | Refills: 0 | DISCHARGE
Start: 2022-10-14

## 2022-10-14 RX ORDER — INFLUENZA VIRUS VACCINE 15; 15; 15; 15 UG/.5ML; UG/.5ML; UG/.5ML; UG/.5ML
0.5 SUSPENSION INTRAMUSCULAR ONCE
Refills: 0 | Status: DISCONTINUED | OUTPATIENT
Start: 2022-10-14 | End: 2022-10-14

## 2022-10-14 RX ADMIN — Medication 25 MICROGRAM(S): at 06:35

## 2022-10-14 RX ADMIN — BUPROPION HYDROCHLORIDE 150 MILLIGRAM(S): 150 TABLET, EXTENDED RELEASE ORAL at 08:52

## 2022-10-14 RX ADMIN — SODIUM CHLORIDE 1 GRAM(S): 9 INJECTION INTRAMUSCULAR; INTRAVENOUS; SUBCUTANEOUS at 08:52

## 2022-10-14 NOTE — BH INPATIENT PSYCHIATRY DISCHARGE NOTE - NSSUICRSKFACTOR_PSY_ALL_CORE
Rahul Noriega RN  08/16/21 0065 Current and Past Psychiatric Diagnoses/Presenting Symptoms/Activating Events/Stressors

## 2022-10-14 NOTE — BH INPATIENT PSYCHIATRY PROGRESS NOTE - NSBHATTESTBILLINGAW_PSY_A_CORE
[id number] Inpatient care - low complexity - 15 minutes
[id number]-Ypnkezukty Inpatient care - low complexity - 15 minutes

## 2022-10-14 NOTE — BH INPATIENT PSYCHIATRY DISCHARGE NOTE - NSBHMETABOLIC_PSY_ALL_CORE_FT
BMI: BMI (kg/m2): 24.8 (10-12-22 @ 00:13)  HbA1c: A1C with Estimated Average Glucose Result: 5.2 % (10-13-22 @ 08:48)    Glucose: POCT Blood Glucose.: 103 mg/dL (01-12-22 @ 21:07)    BP: 96/60 (10-12-22 @ 00:13) (96/60 - 130/69)  Lipid Panel: Date/Time: 10-13-22 @ 08:48  Cholesterol, Serum: 186  Direct LDL: --  HDL Cholesterol, Serum: 48  Total Cholesterol/HDL Ration Measurement: --  Triglycerides, Serum: 198

## 2022-10-14 NOTE — BH INPATIENT PSYCHIATRY PROGRESS NOTE - NSBHATTESTCOMMENTATTENDFT_PSY_A_CORE
21/F with reported hx of depression, anxiety, eating disorder and BPD; has no hx of psych admissions; with 1 prior interrupted SA via drinking bleach at age 13 + prior hx of cutting behavior. Pt has no hx of illicit substance use. currently in psych treatment + meds. tonight, presented to the ED due to worsening depression/anxiety and SI with plan.  Pt reported worsening symptoms of BPD amidst reported good compliance to meds along with therapy. denies recent depressive symptoms. on day of admission, had distressing SI but denies intent or plan. cites current stressors of ongoing conflict with older sister, recent relocation to grandmother's house as well as recent failed friendship. pt's presentation is likely stemming from BPD      At this time patient shows sustained clinical improvement, denies SI. patient is emotionally regulated today, in behavioral control, plan below.  Patient no longer requires continued inpatient psychiatric hospitalization for safety and stabilization, and she will be discharged today.  Pt is in medically and psychiatrically stable condition.  Pt reports has supply of meds at home, no scripts provided.
21/F with reported hx of depression, anxiety, eating disorder and BPD; has no hx of psych admissions; with 1 prior interrupted SA via drinking bleach at age 13 + prior hx of cutting behavior. Pt has no hx of illicit substance use. currently in psych treatment + meds. tonight, presented to the ED due to worsening depression/anxiety and SI with plan.     Patient seems to be improving clinically, denies SI. patient is emotionally regulated today, in behavioral control, plan below.  Patient requires continued inpatient psychiatric hospitalization for safety and stabilization.  Monitor on routine checks, no indication for CO, agree with plan as stated with 72 hour letter submitted, likely discharge tomorrow if sustained resolution of suicidal ideation noted.

## 2022-10-14 NOTE — BH INPATIENT PSYCHIATRY DISCHARGE NOTE - NSBHDCMEDICALFT_PSY_A_CORE
Hypothyroidism, treated with Synthroid 25mcg daily  PCOS, treated with Tri-Sprintec 0.25mg daily  Vasovagal syndrome, treated with NaCl 1 tab daily

## 2022-10-14 NOTE — BH INPATIENT PSYCHIATRY PROGRESS NOTE - MSE OPTIONS
Anemia    DM (diabetes mellitus)    HTN (hypertension)    IgA nephropathy determined by renal biopsy
Structured MSE
Structured MSE

## 2022-10-14 NOTE — BH INPATIENT PSYCHIATRY PROGRESS NOTE - CURRENT MEDICATION
MEDICATIONS  (STANDING):  buPROPion XL (24-Hour) . 150 milliGRAM(s) Oral daily  clonazePAM  Tablet 0.5 milliGRAM(s) Oral at bedtime  levothyroxine 25 MICROGram(s) Oral daily  sodium chloride 1 Gram(s) Oral daily  ziprasidone 80 milliGRAM(s) Oral <User Schedule>    MEDICATIONS  (PRN):  clonazePAM  Tablet 0.5 milliGRAM(s) Oral two times a day PRN anxiety  LORazepam     Tablet 2 milliGRAM(s) Oral every 6 hours PRN Agitation  LORazepam   Injectable 2 milliGRAM(s) IntraMuscular Once PRN severe agitation  
MEDICATIONS  (STANDING):  buPROPion XL (24-Hour) . 150 milliGRAM(s) Oral daily  clonazePAM  Tablet 0.5 milliGRAM(s) Oral at bedtime  levothyroxine 25 MICROGram(s) Oral daily  sodium chloride 1 Gram(s) Oral daily  ziprasidone 80 milliGRAM(s) Oral <User Schedule>    MEDICATIONS  (PRN):  clonazePAM  Tablet 0.5 milliGRAM(s) Oral two times a day PRN anxiety  LORazepam     Tablet 2 milliGRAM(s) Oral every 6 hours PRN Agitation  LORazepam   Injectable 2 milliGRAM(s) IntraMuscular Once PRN severe agitation
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
## 2022-10-14 NOTE — BH INPATIENT PSYCHIATRY PROGRESS NOTE - NSBHCHARTREVIEWVS_PSY_A_CORE FT
Vital Signs Last 24 Hrs  T(C): 36.8 (10-13-22 @ 17:55), Max: 36.8 (10-13-22 @ 17:55)  T(F): 98.3 (10-13-22 @ 17:55), Max: 98.3 (10-13-22 @ 17:55)  HR: --  BP: --  BP(mean): --  RR: --  SpO2: --    Orthostatic VS  10-13-22 @ 08:29  Lying BP: --/-- HR: --  Sitting BP: 115/68 HR: 89  Standing BP: 113/60 HR: 95  Site: --  Mode: --   Vital Signs Last 24 Hrs  T(C): --  T(F): --  HR: --  BP: --  BP(mean): --  RR: --  SpO2: --    Orthostatic VS  10-13-22 @ 08:29  Lying BP: --/-- HR: --  Sitting BP: 115/68 HR: 89  Standing BP: 113/60 HR: 95  Site: --  Mode: --

## 2022-10-14 NOTE — BH INPATIENT PSYCHIATRY DISCHARGE NOTE - ATTENDING DISCHARGE PHYSICAL EXAMINATION:
see  inpatient psychiatric progress note.  Pt denies SI, no intent or plan, no acute risk to self or others at this time.  Submitted a 72 hour letter and discharged in context of such.

## 2022-10-14 NOTE — BH INPATIENT PSYCHIATRY DISCHARGE NOTE - DESCRIPTION
single, no children. used to live with mother until recently, she relocated to grandmother's house - has ongoing conflict with her older sister (who has also recently relocated from [place]). Pt recently graduated from [employer] - last spring. currently employed as part time staff for a local cosmetic shop in [facility]. used to like reading and writer as well as being with friends as well as going to concerts (not now, due to ongoing worsening depression and anxiety symptoms).  not Amish. has cat named "Bubbles". no reported access to guns. recently lost a friendship, which made her sad

## 2022-10-14 NOTE — BH INPATIENT PSYCHIATRY PROGRESS NOTE - NSBHCONSBHPROVDETAILS_PSY_A_CORE  FT
writer spoke with NP Bhavesh Garcia, refer to HPI 
writer spoke with NP Bhavesh Garcia, refer to HPI 10/13

## 2022-10-14 NOTE — BH INPATIENT PSYCHIATRY DISCHARGE NOTE - NSDCMRMEDTOKEN_GEN_ALL_CORE_FT
buPROPion 150 mg/24 hours (XL) oral tablet, extended release: 1 tab(s) orally once a day  clonazePAM 0.5 mg oral tablet: 1 tab(s) orally once a day (at bedtime)  levothyroxine 25 mcg (0.025 mg) oral tablet: 1 tab(s) orally once a day  sodium chloride 1 g oral tablet: 1 tab(s) orally once a day  ziprasidone 80 mg oral capsule: 1 cap(s) orally once a day (at bedtime) - with food

## 2022-10-14 NOTE — BH INPATIENT PSYCHIATRY PROGRESS NOTE - NSBHASSESSSUMMFT_PSY_ALL_CORE
21/F with reported hx of depression, anxiety, eating disorder and BPD; has no hx of psych admissions; with 1 prior interrupted SA via drinking bleach at age 13 + prior hx of cutting behavior. Pt has no hx of illicit substance use. currently in psych treatment + meds. tonight, presented to the ED due to worsening depression/anxiety and SI with plan.     at this time, reports worsening symptoms of BPD amidst reported good compliance to meds along with therapy. denies recent depressive symptoms. on day of admission, had distressing SI but denies intent or plan. cites current stressors of ongoing conflict with older sister, recent relocation to grandmother's house as well as recent failed friendship. pt's presentation is likely stemming from BPD      patient seems to be improving clinically, denies SI. patient is emotionally regulated today, in behavioral control, plan below.  Patient no longer requires continued inpatient psychiatric hospitalization for safety and stabilization, and she will be discharged today.  1. admit to inpatient psych, under voluntary status   2. routine checks; constant observation not needed as patient denies SI  3. continue outpatient psych medications: Bupropion XL 150mg 1 a day, Geodon ziprazadone 80mg 2x a day, Clonopin .5mg 3 a day clonazepam  4. continue outpatient medications: Synthroid 25micro grams 1x a day, NaCl 1g 1x a day. patient reports doesn't need Trispintrec here  5. will attend social, milieu groups  6. discharge today if CGI <=3  21/F with reported hx of depression, anxiety, eating disorder and BPD; has no hx of psych admissions; with 1 prior interrupted SA via drinking bleach at age 13 + prior hx of cutting behavior. Pt has no hx of illicit substance use. currently in psych treatment + meds. tonight, presented to the ED due to worsening depression/anxiety and SI with plan.     at this time, reports worsening symptoms of BPD amidst reported good compliance to meds along with therapy. denies recent depressive symptoms. on day of admission, had distressing SI but denies intent or plan. cites current stressors of ongoing conflict with older sister, recent relocation to grandmother's house as well as recent failed friendship. pt's presentation is likely stemming from BPD      patient shows sustained clinical improvement, denies SI. patient is emotionally regulated today, in behavioral control, plan below.  Patient no longer requires continued inpatient psychiatric hospitalization for safety and stabilization, and she will be discharged today.  Pt is in medically and psychiatrically stable condition.    1. admit to inpatient psych 2W, on a 9.13 voluntary legal status, 72 hour letter submitted shortly after admission and remains in effect  2. routine checks; constant observation not needed as patient denies SI  3. continue outpatient psych medications: Bupropion XL 150mg 1 a day, ziprazadone 80mg 2x a day, Clonopin .5mg 3 a day clonazepam  4. continue outpatient medications: Synthroid 25micro grams 1x a day, NaCl 1g 1x a day. patient reports doesn't need Trispintrec here  5. will attend social, milieu groups  6. discharge today to home with follow up with outpatient as in place, does not meet criteria for involuntary retention

## 2022-10-14 NOTE — BH INPATIENT PSYCHIATRY PROGRESS NOTE - NSCGISEVERILLNESS_PSY_ALL_CORE
5 = Markedly ill - intrusive symptoms that distinctly impair social/occupational function or cause intrusive levels of distress 2 = Borderline mentally ill – subtle or suspected pathology

## 2022-10-14 NOTE — BH INPATIENT PSYCHIATRY PROGRESS NOTE - NSICDXBHSECONDARYDX_PSY_ALL_CORE
MDD (major depressive disorder)   F32.9  Anxiety disorder, unspecified type   F41.9  
MDD (major depressive disorder)   F32.9  Anxiety disorder, unspecified type   F41.9
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
## 2022-10-14 NOTE — BH INPATIENT PSYCHIATRY PROGRESS NOTE - PRN MEDS
MEDICATIONS  (PRN):  clonazePAM  Tablet 0.5 milliGRAM(s) Oral two times a day PRN anxiety  LORazepam     Tablet 2 milliGRAM(s) Oral every 6 hours PRN Agitation  LORazepam   Injectable 2 milliGRAM(s) IntraMuscular Once PRN severe agitation  
MEDICATIONS  (PRN):  clonazePAM  Tablet 0.5 milliGRAM(s) Oral two times a day PRN anxiety  LORazepam     Tablet 2 milliGRAM(s) Oral every 6 hours PRN Agitation  LORazepam   Injectable 2 milliGRAM(s) IntraMuscular Once PRN severe agitation
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
## 2022-11-21 ENCOUNTER — RESULT REVIEW (OUTPATIENT)
Age: 22
End: 2022-11-21

## 2022-12-08 PROBLEM — E03.9 HYPOTHYROIDISM, UNSPECIFIED: Chronic | Status: ACTIVE | Noted: 2022-10-12

## 2022-12-08 PROBLEM — E28.2 POLYCYSTIC OVARIAN SYNDROME: Chronic | Status: ACTIVE | Noted: 2022-10-12

## 2022-12-08 PROBLEM — R55 SYNCOPE AND COLLAPSE: Chronic | Status: ACTIVE | Noted: 2022-10-12

## 2023-01-02 ENCOUNTER — OUTPATIENT (OUTPATIENT)
Dept: OUTPATIENT SERVICES | Facility: HOSPITAL | Age: 23
LOS: 1 days | End: 2023-01-02

## 2023-01-02 DIAGNOSIS — Z00.00 ENCOUNTER FOR GENERAL ADULT MEDICAL EXAMINATION WITHOUT ABNORMAL FINDINGS: ICD-10-CM

## 2023-01-04 ENCOUNTER — OUTPATIENT (OUTPATIENT)
Dept: OUTPATIENT SERVICES | Facility: HOSPITAL | Age: 23
LOS: 1 days | End: 2023-01-04
Payer: COMMERCIAL

## 2023-01-04 ENCOUNTER — APPOINTMENT (OUTPATIENT)
Dept: ULTRASOUND IMAGING | Facility: CLINIC | Age: 23
End: 2023-01-04
Payer: COMMERCIAL

## 2023-01-04 DIAGNOSIS — Z00.8 ENCOUNTER FOR OTHER GENERAL EXAMINATION: ICD-10-CM

## 2023-01-04 PROCEDURE — 76641 ULTRASOUND BREAST COMPLETE: CPT | Mod: 26,50

## 2023-01-04 PROCEDURE — 76641 ULTRASOUND BREAST COMPLETE: CPT

## 2023-02-03 NOTE — BH INPATIENT PSYCHIATRY ASSESSMENT NOTE - VIOLENCE PROTECTIVE FACTORS:
Yes
Residential stability/Relationship stability/Sobriety/Engagement in treatment/Insight into violence risk and need for management/treatment

## 2023-02-22 ENCOUNTER — NON-APPOINTMENT (OUTPATIENT)
Age: 23
End: 2023-02-22

## 2023-02-22 ENCOUNTER — LABORATORY RESULT (OUTPATIENT)
Age: 23
End: 2023-02-22

## 2023-02-22 ENCOUNTER — OFFICE (OUTPATIENT)
Dept: URBAN - METROPOLITAN AREA CLINIC 35 | Facility: CLINIC | Age: 23
Setting detail: OPHTHALMOLOGY
End: 2023-02-22
Payer: COMMERCIAL

## 2023-02-22 ENCOUNTER — APPOINTMENT (OUTPATIENT)
Dept: INTERNAL MEDICINE | Facility: CLINIC | Age: 23
End: 2023-02-22
Payer: COMMERCIAL

## 2023-02-22 VITALS
OXYGEN SATURATION: 97 % | DIASTOLIC BLOOD PRESSURE: 66 MMHG | HEART RATE: 74 BPM | BODY MASS INDEX: 27.83 KG/M2 | SYSTOLIC BLOOD PRESSURE: 104 MMHG | WEIGHT: 129 LBS | RESPIRATION RATE: 16 BRPM | HEIGHT: 57 IN | TEMPERATURE: 97.3 F

## 2023-02-22 DIAGNOSIS — G44.029: ICD-10-CM

## 2023-02-22 DIAGNOSIS — H52.7: ICD-10-CM

## 2023-02-22 DIAGNOSIS — H52.13: ICD-10-CM

## 2023-02-22 PROCEDURE — 99213 OFFICE O/P EST LOW 20 MIN: CPT | Performed by: OPHTHALMOLOGY

## 2023-02-22 PROCEDURE — 92015 DETERMINE REFRACTIVE STATE: CPT | Performed by: OPHTHALMOLOGY

## 2023-02-22 PROCEDURE — 99395 PREV VISIT EST AGE 18-39: CPT

## 2023-02-22 ASSESSMENT — KERATOMETRY
OD_AXISANGLE_DEGREES: 095
OS_K2POWER_DIOPTERS: 44.75
OD_K2POWER_DIOPTERS: 45.25
OD_K1POWER_DIOPTERS: 43.75
OS_K1POWER_DIOPTERS: 43.75
OS_AXISANGLE_DEGREES: 095

## 2023-02-22 ASSESSMENT — AXIALLENGTH_DERIVED
OS_AL: 23.955
OD_AL: 23.9107
OS_AL: 23.955
OD_AL: 23.9107

## 2023-02-22 ASSESSMENT — REFRACTION_AUTOREFRACTION
OD_SPHERE: -2.00
OS_SPHERE: -1.75
OS_AXIS: 119
OD_AXIS: 100
OS_CYLINDER: +0.25
OD_CYLINDER: +0.50

## 2023-02-22 ASSESSMENT — SPHEQUIV_DERIVED
OS_SPHEQUIV: -1.625
OD_SPHEQUIV: -1.75
OD_SPHEQUIV: -1.75
OS_SPHEQUIV: -1.625

## 2023-02-22 ASSESSMENT — REFRACTION_CURRENTRX
OD_SPHERE: -1.25
OS_AXIS: 0
OD_CYLINDER: SPHERE
OS_SPHERE: -1.00
OD_VPRISM_DIRECTION: SV
OD_OVR_VA: 20/
OD_AXIS: 0
OS_OVR_VA: 20/
OS_VPRISM_DIRECTION: SV
OS_CYLINDER: SPHERE

## 2023-02-22 ASSESSMENT — REFRACTION_MANIFEST
OD_CYLINDER: +0.50
OD_CYLINDER: SPHERE
OS_SPHERE: -1.75
OS_CYLINDER: SPHERE
OD_VA1: 20/20
OS_AXIS: 130
OD_SPHERE: -2.00
OS_VA1: 20/20
OS_CYLINDER: +0.25
OD_AXIS: 090

## 2023-02-22 ASSESSMENT — CONFRONTATIONAL VISUAL FIELD TEST (CVF)
OD_FINDINGS: FULL
OS_FINDINGS: FULL

## 2023-02-22 ASSESSMENT — VISUAL ACUITY
OD_BCVA: 20/40
OS_BCVA: 20/40

## 2023-02-22 ASSESSMENT — CORNEAL SURGICAL SCARRING: OD_SCARRING: ANTERIOR

## 2023-02-22 ASSESSMENT — LID POSITION - COMMENTS
OD_COMMENTS: BLEPHAROCHALASIS
OS_COMMENTS: BLEPHAROCHALASIS

## 2023-02-27 ENCOUNTER — OFFICE (OUTPATIENT)
Dept: URBAN - METROPOLITAN AREA CLINIC 35 | Facility: CLINIC | Age: 23
Setting detail: OPHTHALMOLOGY
End: 2023-02-27
Payer: COMMERCIAL

## 2023-02-27 DIAGNOSIS — H17.821: ICD-10-CM

## 2023-02-27 PROBLEM — G44.029 CHRONIC CLUSTER HEADACHE: Status: ACTIVE | Noted: 2023-02-22

## 2023-02-27 PROBLEM — H52.13 MYOPIA; BOTH EYES: Status: ACTIVE | Noted: 2023-02-27

## 2023-02-27 PROCEDURE — 99212 OFFICE O/P EST SF 10 MIN: CPT | Performed by: OPHTHALMOLOGY

## 2023-02-27 ASSESSMENT — REFRACTION_AUTOREFRACTION
OS_SPHERE: -1.00
OD_CYLINDER: +0.50
OS_AXIS: 040
OD_AXIS: 090
OS_CYLINDER: +0.25
OD_SPHERE: -0.75

## 2023-02-27 ASSESSMENT — REFRACTION_MANIFEST
OS_CYLINDER: SPHERE
OD_VA1: 20/20
OD_CYLINDER: +0.50
OD_SPHERE: -2.00
OS_SPHERE: -1.75
OS_VA1: 20/20
OD_AXIS: 090
OS_CYLINDER: +0.25
OS_AXIS: 130
OD_CYLINDER: SPHERE

## 2023-02-27 ASSESSMENT — KERATOMETRY
OD_K2POWER_DIOPTERS: 43.25
OS_K2POWER_DIOPTERS: 43.50
OS_AXISANGLE_DEGREES: 098
OD_AXISANGLE_DEGREES: 094
OD_K1POWER_DIOPTERS: 41.75
OS_K1POWER_DIOPTERS: 42.00

## 2023-02-27 ASSESSMENT — SPHEQUIV_DERIVED
OD_SPHEQUIV: -1.75
OS_SPHEQUIV: -0.875
OD_SPHEQUIV: -0.5
OS_SPHEQUIV: -1.625

## 2023-02-27 ASSESSMENT — AXIALLENGTH_DERIVED
OD_AL: 24.6898
OS_AL: 24.2248
OS_AL: 24.5369
OD_AL: 24.1677

## 2023-02-27 ASSESSMENT — REFRACTION_CURRENTRX
OD_VPRISM_DIRECTION: SV
OS_OVR_VA: 20/
OS_VPRISM_DIRECTION: SV
OD_CYLINDER: SPHERE
OS_AXIS: 0
OS_CYLINDER: SPHERE
OS_SPHERE: -1.00
OD_AXIS: 0
OD_SPHERE: -1.25
OD_OVR_VA: 20/

## 2023-02-27 ASSESSMENT — VISUAL ACUITY
OD_BCVA: 20/60-2
OS_BCVA: 20/50-2

## 2023-02-27 ASSESSMENT — CORNEAL SURGICAL SCARRING: OD_SCARRING: ANTERIOR

## 2023-02-27 ASSESSMENT — LID POSITION - COMMENTS
OD_COMMENTS: BLEPHAROCHALASIS
OS_COMMENTS: BLEPHAROCHALASIS

## 2023-03-06 LAB
25(OH)D3 SERPL-MCNC: 74.2 NG/ML
ALBUMIN SERPL ELPH-MCNC: 3.9 G/DL
ALP BLD-CCNC: 59 U/L
ALT SERPL-CCNC: 11 U/L
ANION GAP SERPL CALC-SCNC: 12 MMOL/L
APPEARANCE: CLEAR
AST SERPL-CCNC: 12 U/L
BASOPHILS # BLD AUTO: 0.03 K/UL
BASOPHILS NFR BLD AUTO: 0.5 %
BILIRUB SERPL-MCNC: 0.3 MG/DL
BILIRUBIN URINE: ABNORMAL
BLOOD URINE: NEGATIVE
BUN SERPL-MCNC: 4 MG/DL
CALCIUM SERPL-MCNC: 9.3 MG/DL
CHLORIDE SERPL-SCNC: 106 MMOL/L
CHOLEST SERPL-MCNC: 183 MG/DL
CO2 SERPL-SCNC: 23 MMOL/L
COLOR: ABNORMAL
CREAT SERPL-MCNC: 0.73 MG/DL
EGFR: 118 ML/MIN/1.73M2
EOSINOPHIL # BLD AUTO: 0.02 K/UL
EOSINOPHIL NFR BLD AUTO: 0.3 %
ESTIMATED AVERAGE GLUCOSE: 97 MG/DL
GLUCOSE QUALITATIVE U: NEGATIVE
GLUCOSE SERPL-MCNC: 91 MG/DL
HBA1C MFR BLD HPLC: 5 %
HCT VFR BLD CALC: 33.5 %
HDLC SERPL-MCNC: 65 MG/DL
HGB BLD-MCNC: 10.7 G/DL
IMM GRANULOCYTES NFR BLD AUTO: 0.2 %
KETONES URINE: NEGATIVE
LDLC SERPL CALC-MCNC: 98 MG/DL
LEUKOCYTE ESTERASE URINE: NEGATIVE
LYMPHOCYTES # BLD AUTO: 1.99 K/UL
LYMPHOCYTES NFR BLD AUTO: 33.3 %
MAN DIFF?: NORMAL
MCHC RBC-ENTMCNC: 28.9 PG
MCHC RBC-ENTMCNC: 31.9 GM/DL
MCV RBC AUTO: 90.5 FL
MONOCYTES # BLD AUTO: 0.33 K/UL
MONOCYTES NFR BLD AUTO: 5.5 %
NEUTROPHILS # BLD AUTO: 3.6 K/UL
NEUTROPHILS NFR BLD AUTO: 60.2 %
NITRITE URINE: POSITIVE
NONHDLC SERPL-MCNC: 118 MG/DL
PH URINE: 7
PLATELET # BLD AUTO: 269 K/UL
POTASSIUM SERPL-SCNC: 4.1 MMOL/L
PROT SERPL-MCNC: 6.5 G/DL
PROTEIN URINE: NORMAL
RBC # BLD: 3.7 M/UL
RBC # FLD: 13.6 %
SODIUM SERPL-SCNC: 141 MMOL/L
SPECIFIC GRAVITY URINE: 1.01
TRIGL SERPL-MCNC: 98 MG/DL
TSH SERPL-ACNC: 1.69 UIU/ML
UROBILINOGEN URINE: ABNORMAL
WBC # FLD AUTO: 5.98 K/UL

## 2023-05-24 ENCOUNTER — APPOINTMENT (OUTPATIENT)
Dept: UROLOGY | Facility: CLINIC | Age: 23
End: 2023-05-24
Payer: COMMERCIAL

## 2023-05-24 DIAGNOSIS — N39.0 URINARY TRACT INFECTION, SITE NOT SPECIFIED: ICD-10-CM

## 2023-05-24 PROCEDURE — 99204 OFFICE O/P NEW MOD 45 MIN: CPT

## 2023-05-24 NOTE — HISTORY OF PRESENT ILLNESS
[None] : no symptoms [FreeTextEntry1] : RUI NICOLE is a 22 year F who presents for recurrent UTI's. States multiple infections, nearly monthly- usually [facility]. Last urine culture in [facility] neg 2/2023, neg 5/2022, + e coli 2021, + klebsiella 2017.\par \par No yeast infections. Never gross hematuria, though micro on 2/22/23 here in [facility] at time of sx. \par \par Knows she has UIT with dysuria, urgency. No current sx, though had a few weeks.\par \par PMH: "MTHRF gene", vasovagal syndrome, hypothyroid, PCOS, asthma\par PSH: no sig\par FH: no sig \par Meds: klonopin, lamicta, eodon, tri-sprintec, synthroid, salt tabs\par SH: sexual activity\par Allergies: sulfa drugs\par

## 2023-05-24 NOTE — PHYSICAL EXAM
[General Appearance - Well Developed] : well developed [General Appearance - Well Nourished] : well nourished [Normal Appearance] : normal appearance [Well Groomed] : well groomed [General Appearance - In No Acute Distress] : no acute distress [Edema] : no peripheral edema [Respiration, Rhythm And Depth] : normal respiratory rhythm and effort [Exaggerated Use Of Accessory Muscles For Inspiration] : no accessory muscle use [Abdomen Soft] : soft [Abdomen Tenderness] : non-tender [Costovertebral Angle Tenderness] : no ~M costovertebral angle tenderness [Urinary Bladder Findings] : the bladder was normal on palpation [Normal Station and Gait] : the gait and station were normal for the patient's age [] : no rash [No Focal Deficits] : no focal deficits [Oriented To Time, Place, And Person] : oriented to person, place, and time [Affect] : the affect was normal [Mood] : the mood was normal [Not Anxious] : not anxious [FreeTextEntry1] : remainder deferred with regular gyn eval

## 2023-05-24 NOTE — ASSESSMENT
[FreeTextEntry1] : I had long d/w pt about UTI prevention in young woman. Associations also depend on persistence or recurrence- last cultures in [place] have been different, but I don't have outside culture results.\par \par In addition, reviewing imaging needs is critical to ensure no obvious focus for colonization such as urinary stasis, foreign body.\par \par Other options such as oral prophylaxis with methenamine/vitamin C, addition of urinary acidifiers such as cranberry juice bid, or daily/tri-weekly suppressive abx dose discussed as well, but will pursue as needed. Brissa-sexual activity abx can also be used as an option\par \par Notably, u/a 2/2023 with 4 rbc, pos nitrites, yeast cells, but culture negative at that time.\par \par I therefore recommend:\par \par 1. renal/bladder  US to eval\par 2. u/a, c&s today - will rx if needed\par 3. pt prefers to avoid meds/herbal supplements at this time\par 4. will arrange ttm to f/u\par \par \par

## 2023-10-04 ENCOUNTER — APPOINTMENT (OUTPATIENT)
Dept: INTERNAL MEDICINE | Facility: CLINIC | Age: 23
End: 2023-10-04
Payer: COMMERCIAL

## 2023-10-04 VITALS
SYSTOLIC BLOOD PRESSURE: 105 MMHG | OXYGEN SATURATION: 100 % | BODY MASS INDEX: 27.95 KG/M2 | DIASTOLIC BLOOD PRESSURE: 69 MMHG | HEART RATE: 102 BPM | HEIGHT: 55 IN | TEMPERATURE: 98 F | WEIGHT: 120.8 LBS

## 2023-10-04 VITALS — HEIGHT: 63 IN | BODY MASS INDEX: 21.26 KG/M2 | WEIGHT: 120 LBS

## 2023-10-04 PROCEDURE — 99214 OFFICE O/P EST MOD 30 MIN: CPT

## 2023-10-04 RX ORDER — ALBUTEROL SULFATE 90 UG/1
108 (90 BASE) INHALANT RESPIRATORY (INHALATION)
Qty: 1 | Refills: 2 | Status: ACTIVE | COMMUNITY
Start: 2021-08-30 | End: 1900-01-01

## 2024-01-03 ENCOUNTER — RESULT REVIEW (OUTPATIENT)
Age: 24
End: 2024-01-03

## 2024-02-28 ENCOUNTER — RX ONLY (RX ONLY)
Age: 24
End: 2024-02-28

## 2024-02-28 ENCOUNTER — OFFICE (OUTPATIENT)
Dept: URBAN - METROPOLITAN AREA CLINIC 35 | Facility: CLINIC | Age: 24
Setting detail: OPHTHALMOLOGY
End: 2024-02-28
Payer: COMMERCIAL

## 2024-02-28 DIAGNOSIS — H02.31: ICD-10-CM

## 2024-02-28 DIAGNOSIS — H17.821: ICD-10-CM

## 2024-02-28 DIAGNOSIS — H02.34: ICD-10-CM

## 2024-02-28 DIAGNOSIS — H52.13: ICD-10-CM

## 2024-02-28 DIAGNOSIS — H16.423: ICD-10-CM

## 2024-02-28 PROCEDURE — 92015 DETERMINE REFRACTIVE STATE: CPT | Performed by: OPHTHALMOLOGY

## 2024-02-28 PROCEDURE — 92014 COMPRE OPH EXAM EST PT 1/>: CPT | Performed by: OPHTHALMOLOGY

## 2024-02-28 ASSESSMENT — REFRACTION_CURRENTRX
OS_CYLINDER: SPHERE
OS_AXIS: 124
OS_OVR_VA: 20/
OD_CYLINDER: +0.50
OD_AXIS: 0
OS_AXIS: 0
OD_SPHERE: -1.25
OD_VPRISM_DIRECTION: SV
OS_OVR_VA: 20/
OD_AXIS: 093
OS_SPHERE: -1.00
OS_VPRISM_DIRECTION: SV
OS_SPHERE: -1.75
OS_VPRISM_DIRECTION: SV
OD_OVR_VA: 20/
OD_VPRISM_DIRECTION: SV
OD_SPHERE: -2.00
OD_OVR_VA: 20/
OD_CYLINDER: SPHERE
OS_CYLINDER: +0.25

## 2024-02-28 ASSESSMENT — REFRACTION_AUTOREFRACTION
OS_CYLINDER: +0.50
OD_SPHERE: PLANO
OS_AXIS: 158
OD_CYLINDER: +0.25
OD_AXIS: 127
OS_SPHERE: -0.75

## 2024-02-28 ASSESSMENT — REFRACTION_MANIFEST
OD_CYLINDER: +0.50
OS_SPHERE: -2.25
OS_SPHERE: -1.75
OD_SPHERE: -2.25
OS_CYLINDER: SPHERE
OD_AXIS: 090
OD_CYLINDER: SPHERE
OS_VA1: 20/20
OD_VA1: 20/20
OD_SPHERE: -2.00
OS_AXIS: 150
OS_CYLINDER: +0.25
OS_CYLINDER: +0.25
OS_AXIS: 130
OD_CYLINDER: SPHERE

## 2024-02-28 ASSESSMENT — SPHEQUIV_DERIVED
OS_SPHEQUIV: -0.5
OS_SPHEQUIV: -2.125
OS_SPHEQUIV: -1.625
OD_SPHEQUIV: -1.75

## 2024-02-28 ASSESSMENT — VASCULARIZATION
OD_VASCULARIZATION: PANNUS
OS_VASCULARIZATION: PANNUS

## 2024-02-28 ASSESSMENT — CONFRONTATIONAL VISUAL FIELD TEST (CVF)
OD_FINDINGS: FULL
OS_FINDINGS: FULL

## 2024-02-28 ASSESSMENT — CORNEAL SURGICAL SCARRING: OD_SCARRING: ANTERIOR

## 2024-02-28 ASSESSMENT — LID POSITION - COMMENTS
OS_COMMENTS: BLEPHAROCHALASIS
OD_COMMENTS: BLEPHAROCHALASIS

## 2024-05-13 ENCOUNTER — APPOINTMENT (OUTPATIENT)
Dept: INTERNAL MEDICINE | Facility: CLINIC | Age: 24
End: 2024-05-13
Payer: COMMERCIAL

## 2024-05-13 VITALS
TEMPERATURE: 98.2 F | HEART RATE: 74 BPM | OXYGEN SATURATION: 97 % | DIASTOLIC BLOOD PRESSURE: 73 MMHG | WEIGHT: 114 LBS | HEIGHT: 63 IN | RESPIRATION RATE: 16 BRPM | BODY MASS INDEX: 20.2 KG/M2 | SYSTOLIC BLOOD PRESSURE: 105 MMHG

## 2024-05-13 DIAGNOSIS — R79.89 OTHER SPECIFIED ABNORMAL FINDINGS OF BLOOD CHEMISTRY: ICD-10-CM

## 2024-05-13 DIAGNOSIS — J45.909 UNSPECIFIED ASTHMA, UNCOMPLICATED: ICD-10-CM

## 2024-05-13 DIAGNOSIS — F41.1 GENERALIZED ANXIETY DISORDER: ICD-10-CM

## 2024-05-13 DIAGNOSIS — Z00.00 ENCOUNTER FOR GENERAL ADULT MEDICAL EXAMINATION W/OUT ABNORMAL FINDINGS: ICD-10-CM

## 2024-05-13 PROCEDURE — 99395 PREV VISIT EST AGE 18-39: CPT

## 2024-05-20 LAB
25(OH)D3 SERPL-MCNC: 64.3 NG/ML
ALBUMIN SERPL ELPH-MCNC: 4.1 G/DL
ALP BLD-CCNC: 58 U/L
ALT SERPL-CCNC: 14 U/L
ANION GAP SERPL CALC-SCNC: 12 MMOL/L
AST SERPL-CCNC: 16 U/L
BILIRUB SERPL-MCNC: 0.4 MG/DL
BUN SERPL-MCNC: 5 MG/DL
CALCIUM SERPL-MCNC: 9.2 MG/DL
CHLORIDE SERPL-SCNC: 104 MMOL/L
CHOLEST SERPL-MCNC: 161 MG/DL
CO2 SERPL-SCNC: 25 MMOL/L
CREAT SERPL-MCNC: 0.7 MG/DL
EGFR: 123 ML/MIN/1.73M2
ESTIMATED AVERAGE GLUCOSE: 97 MG/DL
FOLATE SERPL-MCNC: >20 NG/ML
GLUCOSE SERPL-MCNC: 88 MG/DL
HBA1C MFR BLD HPLC: 5 %
HCT VFR BLD CALC: 37.3 %
HDLC SERPL-MCNC: 71 MG/DL
HGB BLD-MCNC: 12.1 G/DL
LDLC SERPL CALC-MCNC: 78 MG/DL
MCHC RBC-ENTMCNC: 30.5 PG
MCHC RBC-ENTMCNC: 32.4 GM/DL
MCV RBC AUTO: 94 FL
NONHDLC SERPL-MCNC: 90 MG/DL
PLATELET # BLD AUTO: 260 K/UL
POTASSIUM SERPL-SCNC: 4.2 MMOL/L
PROT SERPL-MCNC: 6.8 G/DL
RBC # BLD: 3.97 M/UL
RBC # FLD: 11.9 %
SODIUM SERPL-SCNC: 141 MMOL/L
TRIGL SERPL-MCNC: 61 MG/DL
TSH SERPL-ACNC: 1.48 UIU/ML
VIT B12 SERPL-MCNC: 707 PG/ML
WBC # FLD AUTO: 4.38 K/UL

## 2024-10-16 ENCOUNTER — APPOINTMENT (OUTPATIENT)
Dept: INTERNAL MEDICINE | Facility: CLINIC | Age: 24
End: 2024-10-16
Payer: COMMERCIAL

## 2024-10-16 DIAGNOSIS — R51.9 HEADACHE, UNSPECIFIED: ICD-10-CM
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
## 2024-10-16 PROCEDURE — G2211 COMPLEX E/M VISIT ADD ON: CPT | Mod: NC

## 2024-10-16 PROCEDURE — 99214 OFFICE O/P EST MOD 30 MIN: CPT

## 2024-11-08 ENCOUNTER — NON-APPOINTMENT (OUTPATIENT)
Age: 24
End: 2024-11-08

## 2024-12-02 ENCOUNTER — APPOINTMENT (OUTPATIENT)
Dept: INTERNAL MEDICINE | Facility: CLINIC | Age: 24
End: 2024-12-02